# Patient Record
Sex: MALE
[De-identification: names, ages, dates, MRNs, and addresses within clinical notes are randomized per-mention and may not be internally consistent; named-entity substitution may affect disease eponyms.]

---

## 2018-06-16 ENCOUNTER — HOSPITAL (OUTPATIENT)
Dept: OTHER | Age: 43
End: 2018-06-16
Attending: EMERGENCY MEDICINE

## 2018-09-27 ENCOUNTER — HOSPITAL ENCOUNTER (EMERGENCY)
Facility: HOSPITAL | Age: 43
Discharge: HOME | End: 2018-09-27
Attending: INTERNAL MEDICINE

## 2018-09-27 VITALS
WEIGHT: 178 LBS | HEART RATE: 74 BPM | TEMPERATURE: 99 F | DIASTOLIC BLOOD PRESSURE: 73 MMHG | SYSTOLIC BLOOD PRESSURE: 127 MMHG | HEIGHT: 70 IN | BODY MASS INDEX: 25.48 KG/M2 | RESPIRATION RATE: 18 BRPM | OXYGEN SATURATION: 95 %

## 2018-09-27 DIAGNOSIS — R10.9 ABDOMINAL PAIN, UNSPECIFIED ABDOMINAL LOCATION: ICD-10-CM

## 2018-09-27 DIAGNOSIS — R74.01 TRANSAMINITIS: Primary | ICD-10-CM

## 2018-09-27 DIAGNOSIS — R07.9 CHEST PAIN: ICD-10-CM

## 2018-09-27 DIAGNOSIS — R07.89 CHEST DISCOMFORT: ICD-10-CM

## 2018-09-27 DIAGNOSIS — M54.6 ACUTE BILATERAL THORACIC BACK PAIN: ICD-10-CM

## 2018-09-27 LAB
ALBUMIN SERPL-MCNC: 2.9 G/DL (ref 3.3–5.5)
ALBUMIN SERPL-MCNC: 3 G/DL (ref 3.3–5.5)
ALP SERPL-CCNC: 229 U/L (ref 42–141)
ALP SERPL-CCNC: 237 U/L (ref 42–141)
BILIRUB SERPL-MCNC: 0.8 MG/DL (ref 0.2–1.6)
BILIRUB SERPL-MCNC: 0.8 MG/DL (ref 0.2–1.6)
BUN SERPL-MCNC: 9 MG/DL (ref 7–22)
CALCIUM SERPL-MCNC: 8.8 MG/DL (ref 8–10.3)
CHLORIDE SERPL-SCNC: 104 MMOL/L (ref 98–108)
CREAT SERPL-MCNC: 1.3 MG/DL (ref 0.6–1.2)
CTP QC/QA: YES
FLUAV AG NPH QL: NEGATIVE
FLUBV AG NPH QL: NEGATIVE
GLUCOSE SERPL-MCNC: 122 MG/DL (ref 73–118)
POC ALT (SGPT): 667 U/L (ref 10–47)
POC ALT (SGPT): 668 U/L (ref 10–47)
POC AMYLASE: 114 U/L (ref 14–97)
POC AST (SGOT): 678 U/L (ref 11–38)
POC AST (SGOT): 690 U/L (ref 11–38)
POC CARDIAC TROPONIN I: 0 NG/ML
POC GGT: 152 U/L (ref 5–65)
POC PTINR: 1 (ref 0.9–1.2)
POC PTWBT: 11.6 SEC (ref 9.7–14.3)
POC TCO2: 25 MMOL/L (ref 18–33)
POTASSIUM BLD-SCNC: 3.7 MMOL/L (ref 3.6–5.1)
PROTEIN, POC: 6.3 G/DL (ref 6.4–8.1)
PROTEIN, POC: 6.4 G/DL (ref 6.4–8.1)
SAMPLE: NORMAL
SAMPLE: NORMAL
SODIUM BLD-SCNC: 140 MMOL/L (ref 128–145)

## 2018-09-27 PROCEDURE — 96374 THER/PROPH/DIAG INJ IV PUSH: CPT

## 2018-09-27 PROCEDURE — 85025 COMPLETE CBC W/AUTO DIFF WBC: CPT

## 2018-09-27 PROCEDURE — 99284 EMERGENCY DEPT VISIT MOD MDM: CPT | Mod: 25

## 2018-09-27 PROCEDURE — 96361 HYDRATE IV INFUSION ADD-ON: CPT

## 2018-09-27 PROCEDURE — 80053 COMPREHEN METABOLIC PANEL: CPT

## 2018-09-27 PROCEDURE — 85610 PROTHROMBIN TIME: CPT

## 2018-09-27 PROCEDURE — 81003 URINALYSIS AUTO W/O SCOPE: CPT

## 2018-09-27 PROCEDURE — 63600175 PHARM REV CODE 636 W HCPCS: Performed by: PHYSICIAN ASSISTANT

## 2018-09-27 PROCEDURE — 87804 INFLUENZA ASSAY W/OPTIC: CPT

## 2018-09-27 PROCEDURE — 82040 ASSAY OF SERUM ALBUMIN: CPT

## 2018-09-27 PROCEDURE — 93005 ELECTROCARDIOGRAM TRACING: CPT

## 2018-09-27 PROCEDURE — 93010 ELECTROCARDIOGRAM REPORT: CPT | Mod: ,,, | Performed by: INTERNAL MEDICINE

## 2018-09-27 PROCEDURE — 84484 ASSAY OF TROPONIN QUANT: CPT

## 2018-09-27 PROCEDURE — 25000003 PHARM REV CODE 250: Performed by: PHYSICIAN ASSISTANT

## 2018-09-27 RX ORDER — HYDROMORPHONE HYDROCHLORIDE 2 MG/ML
0.5 INJECTION, SOLUTION INTRAMUSCULAR; INTRAVENOUS; SUBCUTANEOUS ONCE
Status: COMPLETED | OUTPATIENT
Start: 2018-09-27 | End: 2018-09-27

## 2018-09-27 RX ADMIN — HYDROMORPHONE HYDROCHLORIDE 0.5 MG: 2 INJECTION, SOLUTION INTRAMUSCULAR; INTRAVENOUS; SUBCUTANEOUS at 07:09

## 2018-09-27 RX ADMIN — SODIUM CHLORIDE 1000 ML: 0.9 INJECTION, SOLUTION INTRAVENOUS at 07:09

## 2018-09-27 NOTE — ED NOTES
Spoke with pt. He reports all started yesterday,  Took ibuprofen  Yesterday, none taken today.  Generalized body aching mostly the arms

## 2018-09-28 NOTE — DISCHARGE INSTRUCTIONS
Drink lots of fluids, stay well hydrated. Follow-up and establish care with gastroenterology for reevaluation and further recommendations. Return to this ED if unable to tolerate by mouth intake, if you begin with shortness of breath or difficulty breathing, if any other problems occur.
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
## 2018-09-28 NOTE — ED NOTES
PT reports a little decrease in body aches since medications was given. Resp even and non labored. Family at bedside. Will continue to monitor. Warm blanket given per request.

## 2018-09-28 NOTE — ED NOTES
PT lying with eyes closed. Responds to verbal stimuli. Resp even and non labored. NAD noted. Reports pain at 5/10. Family at bedside. Will continue to monitor.

## 2018-10-02 LAB
BILIRUBIN, POC UA: NEGATIVE
BLOOD, POC UA: NEGATIVE
CLARITY, POC UA: CLEAR
COLOR, POC UA: ABNORMAL
GLUCOSE, POC UA: NEGATIVE
KETONES, POC UA: NEGATIVE
LEUKOCYTE EST, POC UA: NEGATIVE
NITRITE, POC UA: NEGATIVE
PH UR STRIP: 7 [PH]
PROTEIN, POC UA: NEGATIVE
SPECIFIC GRAVITY, POC UA: 1.02
UROBILINOGEN, POC UA: 0.2 E.U./DL

## 2020-01-11 ENCOUNTER — HOSPITAL ENCOUNTER (EMERGENCY)
Facility: HOSPITAL | Age: 45
Discharge: HOME | End: 2020-01-11
Attending: EMERGENCY MEDICINE
Payer: MEDICAID

## 2020-01-11 VITALS
SYSTOLIC BLOOD PRESSURE: 124 MMHG | OXYGEN SATURATION: 100 % | HEIGHT: 70 IN | HEART RATE: 68 BPM | TEMPERATURE: 99 F | WEIGHT: 175 LBS | BODY MASS INDEX: 25.05 KG/M2 | DIASTOLIC BLOOD PRESSURE: 70 MMHG | RESPIRATION RATE: 18 BRPM

## 2020-01-11 DIAGNOSIS — R10.9 ABDOMINAL PAIN, UNSPECIFIED ABDOMINAL LOCATION: Primary | ICD-10-CM

## 2020-01-11 DIAGNOSIS — R74.01 TRANSAMINITIS: ICD-10-CM

## 2020-01-11 DIAGNOSIS — R16.0 LIVER MASS: ICD-10-CM

## 2020-01-11 DIAGNOSIS — R05.9 COUGH: ICD-10-CM

## 2020-01-11 DIAGNOSIS — E86.0 DEHYDRATION: ICD-10-CM

## 2020-01-11 LAB
ALBUMIN SERPL-MCNC: 2.7 G/DL (ref 3.3–5.5)
ALBUMIN SERPL-MCNC: 3.3 G/DL (ref 3.3–5.5)
ALBUMIN SERPL-MCNC: 3.3 G/DL (ref 3.3–5.5)
ALP SERPL-CCNC: 150 U/L (ref 42–141)
ALP SERPL-CCNC: 164 U/L (ref 42–141)
ALP SERPL-CCNC: 169 U/L (ref 42–141)
BILIRUB SERPL-MCNC: 0.8 MG/DL (ref 0.2–1.6)
BILIRUB SERPL-MCNC: 1.1 MG/DL (ref 0.2–1.6)
BILIRUB SERPL-MCNC: 1.2 MG/DL (ref 0.2–1.6)
BUN SERPL-MCNC: 12 MG/DL (ref 7–22)
BUN SERPL-MCNC: 14 MG/DL (ref 7–22)
CALCIUM SERPL-MCNC: 8 MG/DL (ref 8–10.3)
CALCIUM SERPL-MCNC: 9.1 MG/DL (ref 8–10.3)
CHLORIDE SERPL-SCNC: 102 MMOL/L (ref 98–108)
CHLORIDE SERPL-SCNC: 108 MMOL/L (ref 98–108)
CREAT SERPL-MCNC: 1.1 MG/DL (ref 0.6–1.2)
CREAT SERPL-MCNC: 1.4 MG/DL (ref 0.6–1.2)
CTP QC/QA: YES
FLUAV AG NPH QL: NEGATIVE
FLUBV AG NPH QL: NEGATIVE
GLUCOSE SERPL-MCNC: 118 MG/DL (ref 73–118)
GLUCOSE SERPL-MCNC: 168 MG/DL (ref 73–118)
POC ALT (SGPT): 149 U/L (ref 10–47)
POC ALT (SGPT): 167 U/L (ref 10–47)
POC ALT (SGPT): 172 U/L (ref 10–47)
POC AMYLASE: 115 U/L (ref 14–97)
POC AST (SGOT): 107 U/L (ref 11–38)
POC AST (SGOT): 111 U/L (ref 11–38)
POC AST (SGOT): 98 U/L (ref 11–38)
POC GGT: 76 U/L (ref 5–65)
POC TCO2: 26 MMOL/L (ref 18–33)
POC TCO2: 26 MMOL/L (ref 18–33)
POTASSIUM BLD-SCNC: 3.5 MMOL/L (ref 3.6–5.1)
POTASSIUM BLD-SCNC: 4 MMOL/L (ref 3.6–5.1)
PROTEIN, POC: 6.3 G/DL (ref 6.4–8.1)
PROTEIN, POC: 7 G/DL (ref 6.4–8.1)
PROTEIN, POC: 7.3 G/DL (ref 6.4–8.1)
SODIUM BLD-SCNC: 139 MMOL/L (ref 128–145)
SODIUM BLD-SCNC: 140 MMOL/L (ref 128–145)

## 2020-01-11 PROCEDURE — 99285 EMERGENCY DEPT VISIT HI MDM: CPT | Mod: 25,ER

## 2020-01-11 PROCEDURE — 87804 INFLUENZA ASSAY W/OPTIC: CPT | Mod: 59,ER

## 2020-01-11 PROCEDURE — 80053 COMPREHEN METABOLIC PANEL: CPT | Mod: ER

## 2020-01-11 PROCEDURE — 85025 COMPLETE CBC W/AUTO DIFF WBC: CPT | Mod: ER

## 2020-01-11 PROCEDURE — 82150 ASSAY OF AMYLASE: CPT | Mod: ER

## 2020-01-11 PROCEDURE — 25500020 PHARM REV CODE 255: Mod: ER | Performed by: EMERGENCY MEDICINE

## 2020-01-11 PROCEDURE — 25000003 PHARM REV CODE 250: Mod: ER | Performed by: PHYSICIAN ASSISTANT

## 2020-01-11 PROCEDURE — 96374 THER/PROPH/DIAG INJ IV PUSH: CPT | Mod: 59,ER

## 2020-01-11 PROCEDURE — 96361 HYDRATE IV INFUSION ADD-ON: CPT | Mod: ER

## 2020-01-11 PROCEDURE — 63600175 PHARM REV CODE 636 W HCPCS: Mod: ER | Performed by: PHYSICIAN ASSISTANT

## 2020-01-11 PROCEDURE — 96375 TX/PRO/DX INJ NEW DRUG ADDON: CPT | Mod: ER

## 2020-01-11 RX ORDER — ONDANSETRON 4 MG/1
4 TABLET, ORALLY DISINTEGRATING ORAL
Status: DISCONTINUED | OUTPATIENT
Start: 2020-01-11 | End: 2020-01-11

## 2020-01-11 RX ORDER — ONDANSETRON HYDROCHLORIDE 4 MG/5ML
4 SOLUTION ORAL
Status: DISCONTINUED | OUTPATIENT
Start: 2020-01-11 | End: 2020-01-11

## 2020-01-11 RX ORDER — ONDANSETRON 4 MG/1
4 TABLET, ORALLY DISINTEGRATING ORAL EVERY 8 HOURS PRN
Qty: 10 TABLET | Refills: 0 | Status: ON HOLD | OUTPATIENT
Start: 2020-01-11 | End: 2020-02-01 | Stop reason: HOSPADM

## 2020-01-11 RX ORDER — ACETAMINOPHEN 500 MG
1000 TABLET ORAL
Status: COMPLETED | OUTPATIENT
Start: 2020-01-11 | End: 2020-01-11

## 2020-01-11 RX ORDER — DICYCLOMINE HYDROCHLORIDE 20 MG/1
20 TABLET ORAL 2 TIMES DAILY
Qty: 20 TABLET | Refills: 0 | Status: SHIPPED | OUTPATIENT
Start: 2020-01-11 | End: 2020-02-10

## 2020-01-11 RX ORDER — ONDANSETRON 2 MG/ML
4 INJECTION INTRAMUSCULAR; INTRAVENOUS
Status: COMPLETED | OUTPATIENT
Start: 2020-01-11 | End: 2020-01-11

## 2020-01-11 RX ORDER — KETOROLAC TROMETHAMINE 30 MG/ML
15 INJECTION, SOLUTION INTRAMUSCULAR; INTRAVENOUS
Status: COMPLETED | OUTPATIENT
Start: 2020-01-11 | End: 2020-01-11

## 2020-01-11 RX ADMIN — IOHEXOL 100 ML: 350 INJECTION, SOLUTION INTRAVENOUS at 04:01

## 2020-01-11 RX ADMIN — KETOROLAC TROMETHAMINE 15 MG: 30 INJECTION, SOLUTION INTRAMUSCULAR at 03:01

## 2020-01-11 RX ADMIN — ACETAMINOPHEN 1000 MG: 500 TABLET ORAL at 03:01

## 2020-01-11 RX ADMIN — SODIUM CHLORIDE 1000 ML: 0.9 INJECTION, SOLUTION INTRAVENOUS at 05:01

## 2020-01-11 RX ADMIN — SODIUM CHLORIDE 1000 ML: 0.9 INJECTION, SOLUTION INTRAVENOUS at 03:01

## 2020-01-11 RX ADMIN — ONDANSETRON HYDROCHLORIDE 4 MG: 2 SOLUTION INTRAMUSCULAR; INTRAVENOUS at 03:01

## 2020-01-11 NOTE — ED NOTES
Second infusion of fluids up as ordered,  Pt. Also given a bottle of poweraid to drink as requested by np
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
## 2020-01-12 NOTE — DISCHARGE INSTRUCTIONS
Follow-up with gastroenterologist for surveillance of liver mass and elevated liver function tests.

## 2020-01-13 ENCOUNTER — HOSPITAL ENCOUNTER (EMERGENCY)
Facility: HOSPITAL | Age: 45
Discharge: HOME | End: 2020-01-13
Attending: EMERGENCY MEDICINE
Payer: MEDICAID

## 2020-01-13 VITALS
HEART RATE: 88 BPM | RESPIRATION RATE: 16 BRPM | TEMPERATURE: 99 F | OXYGEN SATURATION: 95 % | DIASTOLIC BLOOD PRESSURE: 61 MMHG | SYSTOLIC BLOOD PRESSURE: 120 MMHG

## 2020-01-13 DIAGNOSIS — R07.9 CHEST PAIN: ICD-10-CM

## 2020-01-13 DIAGNOSIS — R07.89 CHEST DISCOMFORT: ICD-10-CM

## 2020-01-13 DIAGNOSIS — E87.6 HYPOKALEMIA: ICD-10-CM

## 2020-01-13 DIAGNOSIS — J45.20 MILD INTERMITTENT REACTIVE AIRWAY DISEASE WITHOUT COMPLICATION: Primary | ICD-10-CM

## 2020-01-13 DIAGNOSIS — R50.9 FEVER: ICD-10-CM

## 2020-01-13 DIAGNOSIS — N30.00 ACUTE CYSTITIS WITHOUT HEMATURIA: ICD-10-CM

## 2020-01-13 DIAGNOSIS — R50.9 FEBRILE ILLNESS: ICD-10-CM

## 2020-01-13 LAB
ALBUMIN SERPL-MCNC: 2.3 G/DL (ref 3.3–5.5)
ALLENS TEST: ABNORMAL
ALP SERPL-CCNC: 172 U/L (ref 42–141)
BILIRUB SERPL-MCNC: 1.1 MG/DL (ref 0.2–1.6)
BILIRUBIN, POC UA: ABNORMAL
BLOOD, POC UA: ABNORMAL
BUN SERPL-MCNC: 16 MG/DL (ref 7–22)
CALCIUM SERPL-MCNC: 8.9 MG/DL (ref 8–10.3)
CHLORIDE SERPL-SCNC: 105 MMOL/L (ref 98–108)
CLARITY, POC UA: CLEAR
COLOR, POC UA: ABNORMAL
CREAT SERPL-MCNC: 1.4 MG/DL (ref 0.6–1.2)
CTP QC/QA: YES
GLUCOSE SERPL-MCNC: 130 MG/DL (ref 73–118)
GLUCOSE, POC UA: NEGATIVE
HCO3 UR-SCNC: 21.6 MMOL/L (ref 24–28)
KETONES, POC UA: NEGATIVE
LDH SERPL L TO P-CCNC: 2.02 MMOL/L (ref 0.5–2.2)
LEUKOCYTE EST, POC UA: ABNORMAL
NITRITE, POC UA: POSITIVE
PCO2 BLDA: 32.1 MMHG (ref 35–45)
PH SMN: 7.44 [PH] (ref 7.35–7.45)
PH UR STRIP: 6.5 [PH]
PO2 BLDA: 106 MMHG (ref 40–60)
POC ALT (SGPT): 109 U/L (ref 10–47)
POC AST (SGOT): 52 U/L (ref 11–38)
POC BE: -2 MMOL/L
POC MOLECULAR INFLUENZA A AGN: NEGATIVE
POC MOLECULAR INFLUENZA B AGN: NEGATIVE
POC SATURATED O2: 98 % (ref 95–100)
POC TCO2: 23 MMOL/L (ref 24–29)
POC TCO2: 24 MMOL/L (ref 18–33)
POTASSIUM BLD-SCNC: 3.1 MMOL/L (ref 3.6–5.1)
PROTEIN, POC UA: ABNORMAL
PROTEIN, POC: 6.3 G/DL (ref 6.4–8.1)
SAMPLE: ABNORMAL
SITE: ABNORMAL
SODIUM BLD-SCNC: 139 MMOL/L (ref 128–145)
SPECIFIC GRAVITY, POC UA: 1.02
UROBILINOGEN, POC UA: >=8 E.U./DL

## 2020-01-13 PROCEDURE — 85025 COMPLETE CBC W/AUTO DIFF WBC: CPT | Mod: ER

## 2020-01-13 PROCEDURE — 80053 COMPREHEN METABOLIC PANEL: CPT | Mod: ER

## 2020-01-13 PROCEDURE — 93010 ELECTROCARDIOGRAM REPORT: CPT | Mod: ,,, | Performed by: INTERNAL MEDICINE

## 2020-01-13 PROCEDURE — 96375 TX/PRO/DX INJ NEW DRUG ADDON: CPT | Mod: ER

## 2020-01-13 PROCEDURE — 99285 EMERGENCY DEPT VISIT HI MDM: CPT | Mod: 25,ER

## 2020-01-13 PROCEDURE — 82803 BLOOD GASES ANY COMBINATION: CPT | Mod: ER

## 2020-01-13 PROCEDURE — 93005 ELECTROCARDIOGRAM TRACING: CPT | Mod: ER

## 2020-01-13 PROCEDURE — 87077 CULTURE AEROBIC IDENTIFY: CPT | Mod: 59

## 2020-01-13 PROCEDURE — 25000003 PHARM REV CODE 250: Mod: ER | Performed by: NURSE PRACTITIONER

## 2020-01-13 PROCEDURE — 87040 BLOOD CULTURE FOR BACTERIA: CPT

## 2020-01-13 PROCEDURE — 93010 EKG 12-LEAD: ICD-10-PCS | Mod: ,,, | Performed by: INTERNAL MEDICINE

## 2020-01-13 PROCEDURE — 87502 INFLUENZA DNA AMP PROBE: CPT | Mod: 59,ER

## 2020-01-13 PROCEDURE — 96365 THER/PROPH/DIAG IV INF INIT: CPT | Mod: ER

## 2020-01-13 PROCEDURE — 63600175 PHARM REV CODE 636 W HCPCS: Mod: ER | Performed by: EMERGENCY MEDICINE

## 2020-01-13 PROCEDURE — 87186 SC STD MICRODIL/AGAR DIL: CPT | Mod: 59

## 2020-01-13 PROCEDURE — 25000242 PHARM REV CODE 250 ALT 637 W/ HCPCS: Mod: ER | Performed by: EMERGENCY MEDICINE

## 2020-01-13 PROCEDURE — 87088 URINE BACTERIA CULTURE: CPT

## 2020-01-13 PROCEDURE — 87086 URINE CULTURE/COLONY COUNT: CPT

## 2020-01-13 PROCEDURE — 96361 HYDRATE IV INFUSION ADD-ON: CPT | Mod: ER

## 2020-01-13 PROCEDURE — 25000003 PHARM REV CODE 250: Mod: ER | Performed by: EMERGENCY MEDICINE

## 2020-01-13 PROCEDURE — 94640 AIRWAY INHALATION TREATMENT: CPT | Mod: ER

## 2020-01-13 PROCEDURE — 81003 URINALYSIS AUTO W/O SCOPE: CPT | Mod: ER

## 2020-01-13 RX ORDER — ALBUTEROL SULFATE 2.5 MG/.5ML
2.5 SOLUTION RESPIRATORY (INHALATION)
Status: COMPLETED | OUTPATIENT
Start: 2020-01-13 | End: 2020-01-13

## 2020-01-13 RX ORDER — FLUTICASONE PROPIONATE 50 MCG
2 SPRAY, SUSPENSION (ML) NASAL DAILY
Qty: 16 G | Refills: 0 | Status: ON HOLD | OUTPATIENT
Start: 2020-01-13 | End: 2020-02-01 | Stop reason: HOSPADM

## 2020-01-13 RX ORDER — PREDNISONE 20 MG/1
60 TABLET ORAL
Status: COMPLETED | OUTPATIENT
Start: 2020-01-13 | End: 2020-01-13

## 2020-01-13 RX ORDER — BENZONATATE 100 MG/1
100 CAPSULE ORAL 3 TIMES DAILY PRN
Qty: 20 CAPSULE | Refills: 0 | Status: SHIPPED | OUTPATIENT
Start: 2020-01-13 | End: 2020-01-23

## 2020-01-13 RX ORDER — MONTELUKAST SODIUM 10 MG/1
10 TABLET ORAL NIGHTLY
Qty: 30 TABLET | Refills: 0 | Status: ON HOLD | OUTPATIENT
Start: 2020-01-13 | End: 2020-02-01 | Stop reason: HOSPADM

## 2020-01-13 RX ORDER — NITROFURANTOIN 25; 75 MG/1; MG/1
100 CAPSULE ORAL 2 TIMES DAILY
Qty: 10 CAPSULE | Refills: 0 | Status: SHIPPED | OUTPATIENT
Start: 2020-01-13 | End: 2020-01-18

## 2020-01-13 RX ORDER — IBUPROFEN 600 MG/1
600 TABLET ORAL
Status: COMPLETED | OUTPATIENT
Start: 2020-01-13 | End: 2020-01-13

## 2020-01-13 RX ORDER — KETOROLAC TROMETHAMINE 30 MG/ML
30 INJECTION, SOLUTION INTRAMUSCULAR; INTRAVENOUS
Status: COMPLETED | OUTPATIENT
Start: 2020-01-13 | End: 2020-01-13

## 2020-01-13 RX ORDER — POTASSIUM CHLORIDE 20 MEQ/1
40 TABLET, EXTENDED RELEASE ORAL
Status: COMPLETED | OUTPATIENT
Start: 2020-01-13 | End: 2020-01-13

## 2020-01-13 RX ORDER — PREDNISONE 20 MG/1
40 TABLET ORAL DAILY
Qty: 10 TABLET | Refills: 0 | Status: SHIPPED | OUTPATIENT
Start: 2020-01-13 | End: 2020-01-18

## 2020-01-13 RX ORDER — IPRATROPIUM BROMIDE AND ALBUTEROL SULFATE 2.5; .5 MG/3ML; MG/3ML
3 SOLUTION RESPIRATORY (INHALATION)
Status: COMPLETED | OUTPATIENT
Start: 2020-01-13 | End: 2020-01-13

## 2020-01-13 RX ORDER — ALBUTEROL SULFATE 90 UG/1
2 AEROSOL, METERED RESPIRATORY (INHALATION) EVERY 6 HOURS PRN
Qty: 1 INHALER | Refills: 0 | Status: SHIPPED | OUTPATIENT
Start: 2020-01-13

## 2020-01-13 RX ORDER — GUAIFENESIN/DEXTROMETHORPHAN 100-10MG/5
5 SYRUP ORAL 4 TIMES DAILY PRN
Qty: 120 ML | Refills: 0 | COMMUNITY
Start: 2020-01-13 | End: 2020-01-23

## 2020-01-13 RX ADMIN — POTASSIUM CHLORIDE 40 MEQ: 1500 TABLET, EXTENDED RELEASE ORAL at 09:01

## 2020-01-13 RX ADMIN — IBUPROFEN 600 MG: 600 TABLET ORAL at 08:01

## 2020-01-13 RX ADMIN — PREDNISONE 60 MG: 20 TABLET ORAL at 09:01

## 2020-01-13 RX ADMIN — ALBUTEROL SULFATE 2.5 MG: 2.5 SOLUTION RESPIRATORY (INHALATION) at 09:01

## 2020-01-13 RX ADMIN — KETOROLAC TROMETHAMINE 30 MG: 30 INJECTION, SOLUTION INTRAMUSCULAR; INTRAVENOUS at 09:01

## 2020-01-13 RX ADMIN — SODIUM CHLORIDE 1000 ML: 0.9 INJECTION, SOLUTION INTRAVENOUS at 08:01

## 2020-01-13 RX ADMIN — SODIUM CHLORIDE 1000 ML: 0.9 INJECTION, SOLUTION INTRAVENOUS at 09:01

## 2020-01-13 RX ADMIN — CEFTRIAXONE 1 G: 1 INJECTION, SOLUTION INTRAVENOUS at 10:01

## 2020-01-13 RX ADMIN — IPRATROPIUM BROMIDE AND ALBUTEROL SULFATE 3 ML: .5; 3 SOLUTION RESPIRATORY (INHALATION) at 09:01

## 2020-01-14 NOTE — PROGRESS NOTES
Pt had a + blood cx x 1 and needs to have keflex called in 500mg 4 times a day for 10 days if feeling better per dr. Patricia and if not feeling better or running fever needs to go be admitted. Na for return call v/m left for pt to call back.

## 2020-01-14 NOTE — ED PROVIDER NOTES
"Encounter Date: 1/13/2020       History     Chief Complaint   Patient presents with    Fatigue     pt reports he has been feeling fatigue with intermittent body aches x 2 days. pt reports he began feeling this way after taking the medicine that was given to him 2 days ago(bentyl and zofran)    Generalized Body Aches     body aches x 2 days. reports taking tylenol around 3pm today     44 y.o. Male with chronic pancreatitis presents emergency department complaining of persistent fatigue and generalized body aches since he was seen in the emergency department two days ago.      Per chart review of patient's visit to this ED on January 11, 2020:  Presented with complaint of body aches, vomiting and abdominal pain.  History of temperature was 100.6° at that time.  White count was 16.1,   GGT 76 amylase 115 alk-phos 169 creatinine 1.4  CT abdomen pelvis with IV contrast negative for any acute process.  Flu swab was negative.  Patient was discharged with Zofran and Bentyl.      Since then patient states he has been on bedrest and has been eating soup been drinking water and Gatorade.  He states nausea and abdominal pain are now resolved with Bentyl and Zofran.  He now reports intermittent fever, dry mouth, nasal congestion, postnasal drip, sneezing, cough, intermittent mild headaches, intermittent shortness of breath while talking, and intermittent "striking" left-sided, constant chest pain x 2 days.  He reports taking two Tylenol tablets prior to arrival for fever and body aches.         Smokes marijuana and cigars           Review of patient's allergies indicates:   Allergen Reactions    Compazine [prochlorperazine edisylate]      "It locks up my muscles."      Past Medical History:   Diagnosis Date    Pancreatitis      Past Surgical History:   Procedure Laterality Date    ABDOMINAL SURGERY      CHOLECYSTECTOMY      LIVER SURGERY       History reviewed. No pertinent family history.  Social History "     Tobacco Use    Smoking status: Former Smoker    Smokeless tobacco: Never Used   Substance Use Topics    Alcohol use: Yes    Drug use: Never     Review of Systems   Constitutional: Positive for fatigue and fever.   HENT: Positive for congestion, postnasal drip, rhinorrhea and sneezing. Negative for sore throat, trouble swallowing and voice change.    Respiratory: Positive for cough and shortness of breath. Negative for choking.    Cardiovascular: Positive for chest pain. Negative for palpitations and leg swelling.   Gastrointestinal: Negative for abdominal pain, constipation, diarrhea, nausea and vomiting.   Genitourinary: Negative for decreased urine volume and dysuria.   Musculoskeletal: Positive for myalgias. Negative for arthralgias and gait problem.   Skin: Positive for rash.   Neurological: Positive for headaches (Intermittent, currently resolved). Negative for dizziness, tremors and syncope.   All other systems reviewed and are negative.      Physical Exam     Initial Vitals [01/13/20 1954]   BP Pulse Resp Temp SpO2   102/64 102 18 (!) 102 °F (38.9 °C) 98 %      MAP       --         Physical Exam    Nursing note and vitals reviewed.  Constitutional: He appears well-developed and well-nourished. He is not diaphoretic. No distress.   HENT:   Head: Normocephalic and atraumatic.   Mouth/Throat: Uvula is midline. Mucous membranes are dry. No uvula swelling. No oropharyngeal exudate.   Eyes: Conjunctivae and EOM are normal.   Neck: Normal range of motion and phonation normal. Neck supple. No stridor present.   Cardiovascular: Regular rhythm and intact distal pulses.   Pulmonary/Chest: No accessory muscle usage. No tachypnea. No respiratory distress.   Abdominal: He exhibits no distension. There is no tenderness.   Musculoskeletal: Normal range of motion. He exhibits no tenderness.   Neurological: He is alert and oriented to person, place, and time.   Skin: Skin is warm and intact.   Psychiatric: He has a  normal mood and affect.         ED Course   Procedures  Labs Reviewed   POCT URINALYSIS W/O SCOPE - Abnormal; Notable for the following components:       Result Value    Bilirubin, UA 2+ (*)     Blood, UA Trace-intact (*)     Protein, UA 2+ (*)     Urobilinogen, UA >=8.0 (*)     Nitrite, UA Positive (*)     Leukocytes, UA Trace (*)     All other components within normal limits   ISTAT PROCEDURE - Abnormal; Notable for the following components:    POC PCO2 32.1 (*)     POC PO2 106 (*)     POC HCO3 21.6 (*)     POC TCO2 23 (*)     All other components within normal limits   POCT CMP - Abnormal; Notable for the following components:    Alkaline Phosphatase,  (*)     ALT (SGPT),  (*)     AST (SGOT), POC 52 (*)     POC Creatinine 1.4 (*)     POC Glucose 130 (*)     POC Potassium 3.1 (*)     Protein 6.3 (*)     All other components within normal limits   CULTURE, BLOOD   CULTURE, BLOOD   CULTURE, URINE   POCT INFLUENZA A/B MOLECULAR   POCT CBC   POCT CMP     EKG Readings: (Independently Interpreted)   Initial Reading: No STEMI. Rhythm: Normal Sinus Rhythm. Heart Rate: 94. Ectopy: No Ectopy. Conduction: Normal. ST Segments: Normal ST Segments. T Waves: Normal. Axis: Normal. Clinical Impression: Normal Sinus Rhythm       Imaging Results          X-Ray Chest PA And Lateral (Final result)  Result time 01/13/20 20:25:22    Final result by Edwin Lee MD (01/13/20 20:25:22)                 Impression:      No airspace opacity.    Peribronchial thickening.  The findings may be seen with a viral or a reactive process.    Postoperative changes in the upper abdomen with pneumobilia.      Electronically signed by: Edwin Lee MD  Date:    01/13/2020  Time:    20:25             Narrative:    EXAMINATION:  XR CHEST PA AND LATERAL    CLINICAL HISTORY:  Fever, unspecified    TECHNIQUE:  PA and lateral views of the chest were performed.    COMPARISON:  11/11/2020.    FINDINGS:  The trachea is unremarkable.  The  cardiomediastinal silhouette is within normal limits.  The hilar structures are unremarkable.  The hemidiaphragms are within normal limits.  There is no evidence of free air beneath the hemidiaphragms.  There are no pleural effusions.  There is no evidence of a pneumothorax.  There is no evidence of pneumomediastinum.  No airspace opacity is present.  There is mild peribronchial thickening.  The osseous structures are unremarkable.    There are postoperative changes in the right upper abdominal quadrant.  There is stable pneumobilia.                                                 Labs Reviewed  Admission on 01/13/2020, Discharged on 01/13/2020   Component Date Value Ref Range Status    POC Molecular Influenza A Ag 01/13/2020 Negative  Negative, Not Reported Final    POC Molecular Influenza B Ag 01/13/2020 Negative  Negative, Not Reported Final     Acceptable 01/13/2020 Yes   Final    POC PH 01/13/2020 7.436  7.35 - 7.45 Final    POC PCO2 01/13/2020 32.1* 35 - 45 mmHg Final    POC PO2 01/13/2020 106* 40 - 60 mmHg Final    POC HCO3 01/13/2020 21.6* 24 - 28 mmol/L Final    POC BE 01/13/2020 -2  -2 to 2 mmol/L Final    POC SATURATED O2 01/13/2020 98  95 - 100 % Final    POC Lactate 01/13/2020 2.02  0.5 - 2.2 mmol/L Final    POC TCO2 01/13/2020 23* 24 - 29 mmol/L Final    Sample 01/13/2020 VENOUS   Final    Site 01/13/2020 Other   Final    Allens Test 01/13/2020 N/A   Final    Albumin, POC 01/13/2020 2.3  3.3 - 5.5 g/dL Final    Alkaline Phosphatase, POC 01/13/2020 172* 42 - 141 U/L Final    ALT (SGPT), POC 01/13/2020 109* 10 - 47 U/L Final    AST (SGOT), POC 01/13/2020 52* 11 - 38 U/L Final    POC BUN 01/13/2020 16  7 - 22 mg/dL Final    Calcium, POC 01/13/2020 8.9  8 - 10.3 mg/dL Final    POC Chloride 01/13/2020 105  98 - 108 mmol/L Final    POC Creatinine 01/13/2020 1.4* 0.6 - 1.2 mg/dL Final    POC Glucose 01/13/2020 130* 73 - 118 mg/dL Final    POC Potassium 01/13/2020 3.1*  3.6 - 5.1 mmol/L Final    POC Sodium 01/13/2020 139  128 - 145 mmol/L Final    Bilirubin 01/13/2020 1.1  0.2 - 1.6 mg/dL Final    POC TCO2 01/13/2020 24  18 - 33 mmol/L Final    Protein 01/13/2020 6.3* 6.4 - 8.1 g/dL Final    Glucose, UA 01/13/2020 Negative   Final    Bilirubin, UA 01/13/2020 2+*  Final    Ketones, UA 01/13/2020 Negative   Final    Spec Grav UA 01/13/2020 1.020   Final    Blood, UA 01/13/2020 Trace-intact*  Final    PH, UA 01/13/2020 6.5   Final    Protein, UA 01/13/2020 2+*  Final    Urobilinogen, UA 01/13/2020 >=8.0* E.U./dL Final    Nitrite, UA 01/13/2020 Positive*  Final    Leukocytes, UA 01/13/2020 Trace*  Final    Color, UA 01/13/2020 Orange   Final    Clarity, UA 01/13/2020 Clear   Final        Imaging Reviewed    Imaging Results          X-Ray Chest PA And Lateral (Final result)  Result time 01/13/20 20:25:22    Final result by Edwin Lee MD (01/13/20 20:25:22)                 Impression:      No airspace opacity.    Peribronchial thickening.  The findings may be seen with a viral or a reactive process.    Postoperative changes in the upper abdomen with pneumobilia.      Electronically signed by: Edwin Lee MD  Date:    01/13/2020  Time:    20:25             Narrative:    EXAMINATION:  XR CHEST PA AND LATERAL    CLINICAL HISTORY:  Fever, unspecified    TECHNIQUE:  PA and lateral views of the chest were performed.    COMPARISON:  11/11/2020.    FINDINGS:  The trachea is unremarkable.  The cardiomediastinal silhouette is within normal limits.  The hilar structures are unremarkable.  The hemidiaphragms are within normal limits.  There is no evidence of free air beneath the hemidiaphragms.  There are no pleural effusions.  There is no evidence of a pneumothorax.  There is no evidence of pneumomediastinum.  No airspace opacity is present.  There is mild peribronchial thickening.  The osseous structures are unremarkable.    There are postoperative changes in the right upper  abdominal quadrant.  There is stable pneumobilia.                                Medications given in ED    Medications   ibuprofen tablet 600 mg (600 mg Oral Given 1/13/20 2007)   sodium chloride 0.9% bolus 1,000 mL (0 mLs Intravenous Stopped 1/13/20 2126)   sodium chloride 0.9% bolus 1,000 mL (0 mLs Intravenous Stopped 1/13/20 2215)   ketorolac injection 30 mg (30 mg Intravenous Given 1/13/20 2127)   potassium chloride SA CR tablet 40 mEq (40 mEq Oral Given 1/13/20 2127)   albuterol sulfate nebulizer solution 2.5 mg (2.5 mg Nebulization Given 1/13/20 2115)   albuterol-ipratropium 2.5 mg-0.5 mg/3 mL nebulizer solution 3 mL (3 mLs Nebulization Given 1/13/20 2150)   predniSONE tablet 60 mg (60 mg Oral Given 1/13/20 2154)   cefTRIAXone (ROCEPHIN) 1 g in dextrose 5 % 50 mL IVPB (0 g Intravenous Stopped 1/13/20 2306)       Note was created using voice recognition software. Note may have occasional typographical errors that may not have been identified and edited despite good roseanna initial review prior to signing.             ED Course as of Jan 14 0308 Mon Jan 13, 2020   2253 CP improved after neb x 2. Patient feeling much better after fever resolved and IVFs given.    [DL]      ED Course User Index  [DL] Ramon Patricia MD           Discharge Medications     Discharge Medication List as of 1/13/2020 10:51 PM      START taking these medications    Details   albuterol (PROVENTIL/VENTOLIN HFA) 90 mcg/actuation inhaler Inhale 2 puffs into the lungs every 6 (six) hours as needed for Wheezing or Shortness of Breath., Starting Mon 1/13/2020, Normal      benzonatate (TESSALON) 100 MG capsule Take 1 capsule (100 mg total) by mouth 3 (three) times daily as needed for Cough., Starting Mon 1/13/2020, Until Thu 1/23/2020, Normal      dextromethorphan-guaifenesin  mg/5 ml (ROBITUSSIN-DM)  mg/5 mL liquid Take 5 mLs by mouth 4 (four) times daily as needed (cough)., Starting Mon 1/13/2020, Until Thu 1/23/2020, OTC       fluticasone propionate (FLONASE) 50 mcg/actuation nasal spray 2 sprays (100 mcg total) by Each Nostril route once daily., Starting Mon 1/13/2020, Normal      montelukast (SINGULAIR) 10 mg tablet Take 1 tablet (10 mg total) by mouth every evening., Starting Mon 1/13/2020, Until Wed 2/12/2020, Normal      nitrofurantoin, macrocrystal-monohydrate, (MACROBID) 100 MG capsule Take 1 capsule (100 mg total) by mouth 2 (two) times daily. for 5 days, Starting Mon 1/13/2020, Until Sat 1/18/2020, Normal      predniSONE (DELTASONE) 20 MG tablet Take 2 tablets (40 mg total) by mouth once daily. for 5 days, Starting Mon 1/13/2020, Until Sat 1/18/2020, Normal         CONTINUE these medications which have NOT CHANGED    Details   dicyclomine (BENTYL) 20 mg tablet Take 1 tablet (20 mg total) by mouth 2 (two) times daily., Starting Sat 1/11/2020, Until Mon 2/10/2020, Print      ondansetron (ZOFRAN) 4 MG tablet Take 1 tablet (4 mg total) by mouth every 8 (eight) hours as needed for Nausea., Starting 9/3/2016, Until Discontinued, Print      ondansetron (ZOFRAN-ODT) 4 MG TbDL Take 1 tablet (4 mg total) by mouth every 8 (eight) hours as needed., Starting Sat 1/11/2020, Print      oxycodone (ROXICODONE) 5 MG immediate release tablet Take 1 tablet (5 mg total) by mouth every 4 (four) hours as needed for Pain (Do not drink alcohol or drive while taking this.)., Starting 9/3/2016, Until Discontinued, Print                   Patient discharged to home in stable condition with instructions to:   1. Please take all meds as prescribed.  2. Follow-up with your primary care doctor   3. Return precautions discussed and patient and/or family/caretaker understands to return to the emergency room for any concerns including worsening of your current symptoms, fever, chills, night sweats, worsening pain, chest pain, shortness of breath, nausea, vomiting, diarrhea, bleeding, headache, difficulty talking, visual disturbances, weakness, numbness or any  other acute concerns          Clinical Impression:       ICD-10-CM ICD-9-CM   1. Mild intermittent reactive airway disease without complication J45.20 493.90   2. Fever R50.9 780.60   3. Chest discomfort R07.89 786.59   4. Chest pain R07.9 786.50   5. Acute cystitis without hematuria N30.00 595.0   6. Hypokalemia E87.6 276.8   7. Febrile illness R50.9 780.60         Disposition:   Disposition: Discharged  Condition: Stable                     Ramon Patricia MD  01/14/20 0309

## 2020-01-16 LAB
BACTERIA BLD CULT: ABNORMAL
BACTERIA UR CULT: ABNORMAL

## 2020-01-28 ENCOUNTER — HOSPITAL ENCOUNTER (INPATIENT)
Facility: HOSPITAL | Age: 45
LOS: 4 days | Discharge: HOME | DRG: 441 | End: 2020-02-01
Attending: EMERGENCY MEDICINE | Admitting: HOSPITALIST
Payer: MEDICAID

## 2020-01-28 DIAGNOSIS — R78.81 BACTEREMIA: ICD-10-CM

## 2020-01-28 DIAGNOSIS — R53.1 WEAKNESS: ICD-10-CM

## 2020-01-28 DIAGNOSIS — K75.0 LIVER ABSCESS: Primary | ICD-10-CM

## 2020-01-28 LAB
ALBUMIN SERPL-MCNC: 2.5 G/DL (ref 3.3–5.5)
ALBUMIN SERPL-MCNC: 2.7 G/DL (ref 3.3–5.5)
ALLENS TEST: ABNORMAL
ALP SERPL-CCNC: 211 U/L (ref 42–141)
ALP SERPL-CCNC: 214 U/L (ref 42–141)
AMMONIA PLAS-SCNC: 39 UMOL/L (ref 10–50)
AMPHET+METHAMPHET UR QL: NEGATIVE
BARBITURATES UR QL SCN>200 NG/ML: NEGATIVE
BENZODIAZ UR QL SCN>200 NG/ML: NEGATIVE
BILIRUB SERPL-MCNC: 0.8 MG/DL (ref 0.2–1.6)
BILIRUB SERPL-MCNC: 0.8 MG/DL (ref 0.2–1.6)
BILIRUBIN, POC UA: NEGATIVE
BLOOD, POC UA: NEGATIVE
BUN SERPL-MCNC: 16 MG/DL (ref 7–22)
BZE UR QL SCN: NEGATIVE
CALCIUM SERPL-MCNC: 9.4 MG/DL (ref 8–10.3)
CANNABINOIDS UR QL SCN: NORMAL
CHLORIDE SERPL-SCNC: 102 MMOL/L (ref 98–108)
CLARITY, POC UA: CLEAR
COLOR, POC UA: ABNORMAL
CREAT SERPL-MCNC: 1.4 MG/DL (ref 0.6–1.2)
CREAT UR-MCNC: 222.6 MG/DL (ref 23–375)
GLUCOSE SERPL-MCNC: 115 MG/DL (ref 73–118)
GLUCOSE, POC UA: NEGATIVE
HCO3 UR-SCNC: 22.4 MMOL/L (ref 24–28)
KETONES, POC UA: NEGATIVE
LDH SERPL L TO P-CCNC: 1.18 MMOL/L (ref 0.5–2.2)
LEUKOCYTE EST, POC UA: ABNORMAL
METHADONE UR QL SCN>300 NG/ML: NEGATIVE
NITRITE, POC UA: POSITIVE
OPIATES UR QL SCN: NEGATIVE
PCO2 BLDA: 25.7 MMHG (ref 35–45)
PCP UR QL SCN>25 NG/ML: NEGATIVE
PH SMN: 7.55 [PH] (ref 7.35–7.45)
PH UR STRIP: 6 [PH]
PO2 BLDA: 44 MMHG (ref 40–60)
POC ALT (SGPT): 42 U/L (ref 10–47)
POC ALT (SGPT): 44 U/L (ref 10–47)
POC AMYLASE: 97 U/L (ref 14–97)
POC AST (SGOT): 28 U/L (ref 11–38)
POC AST (SGOT): 32 U/L (ref 11–38)
POC BE: 1 MMOL/L
POC GGT: 81 U/L (ref 5–65)
POC SATURATED O2: 87 % (ref 95–100)
POC TCO2: 23 MMOL/L (ref 24–29)
POC TCO2: 24 MMOL/L (ref 18–33)
POTASSIUM BLD-SCNC: 3.6 MMOL/L (ref 3.6–5.1)
PROTEIN, POC UA: ABNORMAL
PROTEIN, POC: 8 G/DL (ref 6.4–8.1)
PROTEIN, POC: 8.2 G/DL (ref 6.4–8.1)
SAMPLE: ABNORMAL
SITE: ABNORMAL
SODIUM BLD-SCNC: 137 MMOL/L (ref 128–145)
SPECIFIC GRAVITY, POC UA: 1.02
TOXICOLOGY INFORMATION: NORMAL
UROBILINOGEN, POC UA: 2 E.U./DL

## 2020-01-28 PROCEDURE — 93010 EKG 12-LEAD: ICD-10-PCS | Mod: ,,, | Performed by: INTERNAL MEDICINE

## 2020-01-28 PROCEDURE — 63600175 PHARM REV CODE 636 W HCPCS: Performed by: EMERGENCY MEDICINE

## 2020-01-28 PROCEDURE — 80307 DRUG TEST PRSMV CHEM ANLYZR: CPT

## 2020-01-28 PROCEDURE — 25500020 PHARM REV CODE 255: Mod: ER | Performed by: EMERGENCY MEDICINE

## 2020-01-28 PROCEDURE — 82803 BLOOD GASES ANY COMBINATION: CPT | Mod: ER

## 2020-01-28 PROCEDURE — 87040 BLOOD CULTURE FOR BACTERIA: CPT

## 2020-01-28 PROCEDURE — 96361 HYDRATE IV INFUSION ADD-ON: CPT | Mod: ER

## 2020-01-28 PROCEDURE — 63600175 PHARM REV CODE 636 W HCPCS: Mod: ER | Performed by: EMERGENCY MEDICINE

## 2020-01-28 PROCEDURE — 11000001 HC ACUTE MED/SURG PRIVATE ROOM

## 2020-01-28 PROCEDURE — 85025 COMPLETE CBC W/AUTO DIFF WBC: CPT | Mod: ER

## 2020-01-28 PROCEDURE — 82140 ASSAY OF AMMONIA: CPT

## 2020-01-28 PROCEDURE — 63600175 PHARM REV CODE 636 W HCPCS: Performed by: HOSPITALIST

## 2020-01-28 PROCEDURE — 82040 ASSAY OF SERUM ALBUMIN: CPT | Mod: ER

## 2020-01-28 PROCEDURE — 25000003 PHARM REV CODE 250: Performed by: INTERNAL MEDICINE

## 2020-01-28 PROCEDURE — 81003 URINALYSIS AUTO W/O SCOPE: CPT | Mod: ER

## 2020-01-28 PROCEDURE — 80053 COMPREHEN METABOLIC PANEL: CPT | Mod: ER

## 2020-01-28 PROCEDURE — 96375 TX/PRO/DX INJ NEW DRUG ADDON: CPT | Mod: ER

## 2020-01-28 PROCEDURE — 93005 ELECTROCARDIOGRAM TRACING: CPT | Mod: ER

## 2020-01-28 PROCEDURE — 96365 THER/PROPH/DIAG IV INF INIT: CPT | Mod: ER

## 2020-01-28 PROCEDURE — 99285 EMERGENCY DEPT VISIT HI MDM: CPT | Mod: 25,ER

## 2020-01-28 PROCEDURE — 25000003 PHARM REV CODE 250: Performed by: HOSPITALIST

## 2020-01-28 PROCEDURE — 93010 ELECTROCARDIOGRAM REPORT: CPT | Mod: ,,, | Performed by: INTERNAL MEDICINE

## 2020-01-28 RX ORDER — ACETAMINOPHEN 500 MG
500 TABLET ORAL EVERY 6 HOURS PRN
Status: DISCONTINUED | OUTPATIENT
Start: 2020-01-28 | End: 2020-02-01 | Stop reason: HOSPADM

## 2020-01-28 RX ORDER — FLUTICASONE PROPIONATE 50 MCG
2 SPRAY, SUSPENSION (ML) NASAL DAILY
Status: DISCONTINUED | OUTPATIENT
Start: 2020-01-29 | End: 2020-01-30

## 2020-01-28 RX ORDER — ONDANSETRON 2 MG/ML
4 INJECTION INTRAMUSCULAR; INTRAVENOUS EVERY 8 HOURS PRN
Status: DISCONTINUED | OUTPATIENT
Start: 2020-01-28 | End: 2020-02-01 | Stop reason: HOSPADM

## 2020-01-28 RX ORDER — HYDROMORPHONE HYDROCHLORIDE 2 MG/ML
0.5 INJECTION, SOLUTION INTRAMUSCULAR; INTRAVENOUS; SUBCUTANEOUS
Status: COMPLETED | OUTPATIENT
Start: 2020-01-28 | End: 2020-01-28

## 2020-01-28 RX ORDER — IPRATROPIUM BROMIDE AND ALBUTEROL SULFATE 2.5; .5 MG/3ML; MG/3ML
3 SOLUTION RESPIRATORY (INHALATION) EVERY 4 HOURS PRN
Status: DISCONTINUED | OUTPATIENT
Start: 2020-01-28 | End: 2020-02-01 | Stop reason: HOSPADM

## 2020-01-28 RX ORDER — IBUPROFEN 600 MG/1
600 TABLET ORAL ONCE
Status: COMPLETED | OUTPATIENT
Start: 2020-01-28 | End: 2020-01-28

## 2020-01-28 RX ORDER — SODIUM CHLORIDE 9 MG/ML
1000 INJECTION, SOLUTION INTRAVENOUS
Status: COMPLETED | OUTPATIENT
Start: 2020-01-28 | End: 2020-01-28

## 2020-01-28 RX ORDER — HYDROMORPHONE HYDROCHLORIDE 2 MG/ML
0.5 INJECTION, SOLUTION INTRAMUSCULAR; INTRAVENOUS; SUBCUTANEOUS EVERY 4 HOURS PRN
Status: DISCONTINUED | OUTPATIENT
Start: 2020-01-28 | End: 2020-01-28

## 2020-01-28 RX ORDER — SODIUM CHLORIDE 0.9 % (FLUSH) 0.9 %
10 SYRINGE (ML) INJECTION
Status: DISCONTINUED | OUTPATIENT
Start: 2020-01-28 | End: 2020-02-01 | Stop reason: HOSPADM

## 2020-01-28 RX ORDER — HYDROMORPHONE HYDROCHLORIDE 2 MG/ML
0.5 INJECTION, SOLUTION INTRAMUSCULAR; INTRAVENOUS; SUBCUTANEOUS EVERY 4 HOURS PRN
Status: DISCONTINUED | OUTPATIENT
Start: 2020-01-28 | End: 2020-01-29

## 2020-01-28 RX ORDER — METRONIDAZOLE 500 MG/100ML
500 INJECTION, SOLUTION INTRAVENOUS
Status: DISCONTINUED | OUTPATIENT
Start: 2020-01-28 | End: 2020-01-28

## 2020-01-28 RX ORDER — MONTELUKAST SODIUM 10 MG/1
10 TABLET ORAL NIGHTLY
Status: DISCONTINUED | OUTPATIENT
Start: 2020-01-28 | End: 2020-01-28

## 2020-01-28 RX ORDER — SODIUM CHLORIDE, SODIUM LACTATE, POTASSIUM CHLORIDE, CALCIUM CHLORIDE 600; 310; 30; 20 MG/100ML; MG/100ML; MG/100ML; MG/100ML
INJECTION, SOLUTION INTRAVENOUS CONTINUOUS
Status: DISCONTINUED | OUTPATIENT
Start: 2020-01-28 | End: 2020-02-01 | Stop reason: HOSPADM

## 2020-01-28 RX ORDER — OXYCODONE HYDROCHLORIDE 5 MG/1
5 TABLET ORAL EVERY 6 HOURS PRN
Status: DISCONTINUED | OUTPATIENT
Start: 2020-01-28 | End: 2020-01-28

## 2020-01-28 RX ADMIN — IOHEXOL 75 ML: 350 INJECTION, SOLUTION INTRAVENOUS at 02:01

## 2020-01-28 RX ADMIN — SODIUM CHLORIDE, SODIUM LACTATE, POTASSIUM CHLORIDE, AND CALCIUM CHLORIDE: .6; .31; .03; .02 INJECTION, SOLUTION INTRAVENOUS at 06:01

## 2020-01-28 RX ADMIN — HYDROMORPHONE HYDROCHLORIDE 0.5 MG: 2 INJECTION, SOLUTION INTRAMUSCULAR; INTRAVENOUS; SUBCUTANEOUS at 02:01

## 2020-01-28 RX ADMIN — HYDROMORPHONE HYDROCHLORIDE 0.5 MG: 2 INJECTION, SOLUTION INTRAMUSCULAR; INTRAVENOUS; SUBCUTANEOUS at 07:01

## 2020-01-28 RX ADMIN — PIPERACILLIN AND TAZOBACTAM 4.5 G: 4; .5 INJECTION, POWDER, LYOPHILIZED, FOR SOLUTION INTRAVENOUS; PARENTERAL at 10:01

## 2020-01-28 RX ADMIN — ACETAMINOPHEN 500 MG: 500 TABLET ORAL at 08:01

## 2020-01-28 RX ADMIN — IBUPROFEN 600 MG: 600 TABLET, FILM COATED ORAL at 10:01

## 2020-01-28 RX ADMIN — SODIUM CHLORIDE 1000 ML: 0.9 INJECTION, SOLUTION INTRAVENOUS at 01:01

## 2020-01-28 RX ADMIN — PIPERACILLIN AND TAZOBACTAM 4.5 G: 4; .5 INJECTION, POWDER, LYOPHILIZED, FOR SOLUTION INTRAVENOUS; PARENTERAL at 02:01

## 2020-01-28 NOTE — ED PROVIDER NOTES
"Encounter Date: 1/28/2020    SCRIBE #1 NOTE: I, Maddison Calzada, am scribing for, and in the presence of,  Dr. Celestin. I have scribed the following portions of the note - Other sections scribed: HPI, ROS, PE.       History     Chief Complaint   Patient presents with    Diarrhea     Pt states," Diarrhea and weakness since I was discharged."    Weakness     44 y.o. male with generalized weakness beginning yesterday. Patient also reports dry mouth, body aches, abdominal pain, diarrhea, loss of appetite, and weight loss. Patient is confused and spouse reports that he thinks he is speaking to her and but no words are coming out. Patient reports having one bad tooth. He denies pain with urination or BMs. Patient has been to ED twice since 01/13/2020 and has been put on abx and an inhaler, however, he is progressively worsening.  Patient has been drinking a significant amount of Pedialyte, water, and Gatorade. He reports his heart speeds up with Powerade intake recently. Patient quit smoking 5 years ago. PMHX of pancreatitis. Patient reports marijuana usage.    The history is provided by the patient. No  was used.     Review of patient's allergies indicates:   Allergen Reactions    Compazine [prochlorperazine edisylate]      "It locks up my muscles."      Past Medical History:   Diagnosis Date    Pancreatitis      Past Surgical History:   Procedure Laterality Date    ABDOMINAL SURGERY      CHOLECYSTECTOMY      LIVER SURGERY       History reviewed. No pertinent family history.  Social History     Tobacco Use    Smoking status: Former Smoker    Smokeless tobacco: Never Used   Substance Use Topics    Alcohol use: Yes    Drug use: Never     Review of Systems   Constitutional: Positive for activity change, appetite change and unexpected weight change.   HENT:        Positive dry mouth   Respiratory: Negative for shortness of breath.    Cardiovascular: Negative for chest pain. "   Gastrointestinal: Positive for abdominal pain and diarrhea.   Genitourinary: Negative for difficulty urinating and dysuria.   Musculoskeletal: Positive for myalgias.   Skin: Negative for rash.   Neurological: Positive for weakness.   Psychiatric/Behavioral: Positive for confusion.   All other systems reviewed and are negative.      Physical Exam     Initial Vitals [01/28/20 1135]   BP Pulse Resp Temp SpO2   107/80 89 16 99.4 °F (37.4 °C) 99 %      MAP       --         Physical Exam    Nursing note and vitals reviewed.  Constitutional: He appears well-developed and well-nourished. He appears lethargic. He appears distressed.   HENT:   Head: Normocephalic and atraumatic.   Mouth/Throat: Mucous membranes are dry.   Eyes: Conjunctivae are normal.   Neck: Normal range of motion. Neck supple.   Cardiovascular: Normal rate and intact distal pulses.   Heart tones are distant    Pulmonary/Chest: Breath sounds normal. No respiratory distress.   Abdominal: Soft. There is tenderness (diffusely).   Musculoskeletal: Normal range of motion.   Neurological: He is oriented to person, place, and time. He appears lethargic. GCS score is 15. GCS eye subscore is 4. GCS verbal subscore is 5. GCS motor subscore is 6.   Skin: Skin is warm and dry.   Psychiatric: He has a normal mood and affect.         ED Course   Procedures  Labs Reviewed   POCT URINALYSIS W/O SCOPE - Abnormal; Notable for the following components:       Result Value    Protein, UA 1+ (*)     Urobilinogen, UA 2.0 (*)     Nitrite, UA Positive (*)     Leukocytes, UA 1+ (*)     All other components within normal limits   ISTAT PROCEDURE - Abnormal; Notable for the following components:    POC PH 7.548 (*)     POC PCO2 25.7 (*)     POC HCO3 22.4 (*)     POC SATURATED O2 87 (*)     POC TCO2 23 (*)     All other components within normal limits   POCT LIVER PANEL - Abnormal; Notable for the following components:    Alkaline Phosphatase,  (*)     POC GGT 81 (*)     All  other components within normal limits   POCT CMP - Abnormal; Notable for the following components:    Alkaline Phosphatase,  (*)     POC Creatinine 1.4 (*)     Protein 8.2 (*)     All other components within normal limits   CULTURE, BLOOD   CULTURE, BLOOD   AMMONIA   DRUG SCREEN PANEL, URINE EMERGENCY   POCT CBC   POCT URINALYSIS W/O SCOPE   POCT GLUCOSE, HAND-HELD DEVICE   POCT CMP   POCT LIVER PANEL        ECG Results          EKG 12-lead (In process)  Result time 01/28/20 13:49:52    In process by Interface, Lab In [facility] (01/28/20 13:49:52)                 Narrative:    Test Reason : R53.1,    Vent. Rate : 078 BPM     Atrial Rate : 078 BPM     P-R Int : 152 ms          QRS Dur : 086 ms      QT Int : 356 ms       P-R-T Axes : 026 042 040 degrees     QTc Int : 405 ms    Normal sinus rhythm  Normal ECG  When compared with ECG of 13-JAN-2020 20:09,  Nonspecific T wave abnormality no longer evident in Inferior leads  Nonspecific T wave abnormality no longer evident in Lateral leads    Referred By: [email or web address]   SELF           Confirmed By:                             Imaging Results           CT Abdomen Pelvis With Contrast (Final result)  Result time 01/28/20 14:48:03    Final result by Josh Good MD (01/28/20 14:48:03)                 Impression:      1. Interval enlargement with irregular thick-walled enhancement of previous low-attenuation lesion in the lateral left hepatic lobe.  Given the short term interval changes, findings are highly concerning for abscess formation in this patient with history of fever.  While malignancy is not entirely excluded, this is considered less likely.  2. Grossly stable pneumobilia, biliary ductal dilatation and pancreatic ductal dilatation.  3. Surgical changes of prior cholecystectomy and of the bowel, without associated obstruction, as above.  4. Liquid stool within the large bowel, nonspecific but could reflect sequela of a nonspecific diarrheal illness.  5.  Additional stable findings as above.  This report was flagged in Epic as abnormal.      Electronically signed by: Josh Good MD  Date:    01/28/2020  Time:    14:48             Narrative:    EXAMINATION:  CT ABDOMEN PELVIS WITH CONTRAST    CLINICAL HISTORY:  Abd pain, fever, abscess suspected;    TECHNIQUE:  Low dose axial images, sagittal and coronal reformations were obtained from the lung bases to the pubic symphysis following the IV administration of 75 mL of Omnipaque 350 .  Oral contrast was not given.    COMPARISON:  CT abdomen and pelvis 01/11/2020    FINDINGS:  Imaged lung bases show minimal dependent atelectasis.  Base of the heart is within normal limits.    Liver is normal in size.  The previous hypodense lesion within the posterior aspect of the lateral left hepatic lobe, has increased in size now with irregular thickened walls demonstrating heterogeneous enhancement similar to adjacent hepatic parenchyma, measuring up to 5 x 5.8 cm, previously up to 3.2 cm in maximum transaxial dimension.  There is thin peripheral area of hypoattenuation with more central hypoattenuating regions associated with this mass.  No new hepatic lesion definitively seen.    Surgical changes of cholecystectomy again noted.  There is pneumobilia with intrahepatic biliary prominence, similar in appearance to the previous examination.  There is continued prominence of the common bile duct also stable.    Portal vein, SMV, splenic vein and IVC appear patent.    Continued dilatation of the pancreatic duct similar to previous examination without convincing intraluminal filling defect.  Surgical changes are again noted involving the 2nd portion of the duodenum without obstruction.  Postsurgical changes of prior gastrojejunostomy again noted.  Spleen is upper limits of normal in size similar to prior without focal process seen.  Adrenal glands are within normal limits.    Bilateral kidneys are normal in size, shape and location with  symmetric normal enhancement.  No hydronephrosis or significant perinephric stranding.  Urinary bladder is within normal limits.  Prostate and seminal vesicles are within normal limits.  Pelvic phleboliths noted.    No ascites, free air or lymphadenopathy.  No aortic aneurysm or dissection.  No significant atherosclerosis.    Appendix is within normal limits.  Surgical change of the small bowel similar to prior without obstruction.  Scattered liquid stool throughout the colon and rectum suggesting a nonspecific diarrheal illness.  No evidence of bowel obstruction or inflammation.  No pneumatosis or portal venous gas.    Osseous structures appear stable without acute or destructive process seen.                               X-Ray Chest AP Portable (Final result)  Result time 01/28/20 13:01:16    Final result by Josh Good MD (01/28/20 13:01:16)                 Impression:      No detrimental change or radiographic acute intrathoracic process seen.  Specifically, no focal consolidation.      Electronically signed by: Josh Good MD  Date:    01/28/2020  Time:    13:01             Narrative:    EXAMINATION:  XR CHEST AP PORTABLE    CLINICAL HISTORY:  Weakness    TECHNIQUE:  Single frontal view of the chest was performed.    COMPARISON:  Chest radiograph 01/13/2020    FINDINGS:  No detrimental change.The lungs are clear, with normal appearance of pulmonary vasculature and no pleural effusion or pneumothorax.    The cardiac silhouette is normal in size. The hilar and mediastinal contours are unremarkable.    Bones are intact.                                 Medical Decision Making:   History:   Old Medical Records: I decided to obtain old medical records.  Clinical Tests:   Lab Tests: Ordered  Radiological Study: Ordered  Pt refused Flu Swab.           Scribe Attestation:   Scribe #1: I performed the above scribed service and the documentation accurately describes the services I performed. I attest to the accuracy  of the note.    I attest that I personally performed the services documented by the scribe and acknowledged and confirm the content of the note.   Nurses notes were reviewed.  Lee Celestin              ED Course as of Jan 28 1604   Tue Jan 28, 2020   1245 Lactate is normal at 1.18    [MH]   1245 Alk phos is elevated 214.    [MH]   1245 Amylase is normal    [MH]   1246 CBC reveals leukocytosis at 14,000.  H&H and platelets are normal.    [MH]   1354 CXR wnl    [MH]   1429 My a independent interpretation of the EKG is normal sinus rhythm with no ST segment elevation or depression.    [MH]   1456 Nitrate positive.  1+wbcs    [MH]   1457 Interval enlargement with irregular thick-walled enhancement of previous low-attenuation lesion in the lateral left hepatic lobe.  Given the short term interval changes, findings are highly concerning for abscess formation in this patient with history of fever.  While malignancy is not entirely excluded, this is considered less likely.  2. Grossly stable pneumobilia, biliary ductal dilatation and pancreatic ductal dilatation.    [MH]   1503 Pt request transfer to [facility].    []   1557 Accepted by Dr. Ngo    []      ED Course User Index  [MH] Lee Celestin MD                Clinical Impression:     1. Liver abscess    2. Weakness                   Transfer initiated with [facility].  Patient requested [place] but on diversion, and patient would like to be transferred to [facility].      4:06 PM  The medical management of Jyoti Little has been transferred to the admitting team. Total critical care time provided by me was: 70 minutes and included Coordination of care with other physicians  Review and interpretation of radiologic studies with re-assessment of plan of care  Review and interpretation of laboratory studies with re-assessment of plan of care  Review of diagnosis, treatment options and prognosis with patient  Review of diagnosis, treatment options and  prognosis with family/caregiver.  At this time, the patient's condition is unchanged, and this was relayed to the accepting team.  Please see notes from the admitting team for continued care.  Lee Celestin 4:06 PM             Lee Celestin MD  01/28/20 1247       Lee Celestin MD  01/28/20 1507       Lee Celestin MD  01/28/20 1606

## 2020-01-28 NOTE — ED NOTES
[id number] @ bedside for pt transport to [facility]. Pt in NAD, VSS, upon ED exit via EMS stretcher, girlfriend @ side.

## 2020-01-28 NOTE — ED NOTES
Report called to OLE Flores @ [facility], awaiting transport via [facility], pt resting quietly w/eyes closed in NAD, VSS, rails up x 2, will continue to closely monitor.

## 2020-01-29 LAB
ALBUMIN SERPL BCP-MCNC: 2.1 G/DL (ref 3.5–5.2)
ALP SERPL-CCNC: 212 U/L (ref 55–135)
ALT SERPL W/O P-5'-P-CCNC: 35 U/L (ref 10–44)
ANION GAP SERPL CALC-SCNC: 9 MMOL/L (ref 8–16)
AORTIC ROOT ANNULUS: 2.85 CM
AORTIC VALVE CUSP SEPERATION: 2.19 CM
ASCENDING AORTA: 2.7 CM
AST SERPL-CCNC: 20 U/L (ref 10–40)
AV INDEX (PROSTH): 0.75
AV MEAN GRADIENT: 6 MMHG
AV PEAK GRADIENT: 10 MMHG
AV VALVE AREA: 2.81 CM2
AV VELOCITY RATIO: 0.77
BASOPHILS # BLD AUTO: 0.03 K/UL (ref 0–0.2)
BASOPHILS NFR BLD: 0.2 % (ref 0–1.9)
BILIRUB SERPL-MCNC: 0.7 MG/DL (ref 0.1–1)
BSA FOR ECHO PROCEDURE: 1.77 M2
BUN SERPL-MCNC: 15 MG/DL (ref 6–20)
CALCIUM SERPL-MCNC: 9 MG/DL (ref 8.7–10.5)
CHLORIDE SERPL-SCNC: 104 MMOL/L (ref 95–110)
CO2 SERPL-SCNC: 24 MMOL/L (ref 23–29)
CREAT SERPL-MCNC: 1.4 MG/DL (ref 0.5–1.4)
CV ECHO LV RWT: 0.67 CM
DIFFERENTIAL METHOD: ABNORMAL
DOP CALC AO PEAK VEL: 1.62 M/S
DOP CALC AO VTI: 26.7 CM
DOP CALC LVOT AREA: 3.8 CM2
DOP CALC LVOT DIAMETER: 2.19 CM
DOP CALC LVOT PEAK VEL: 1.24 M/S
DOP CALC LVOT STROKE VOLUME: 75.07 CM3
DOP CALCLVOT PEAK VEL VTI: 19.94 CM
E WAVE DECELERATION TIME: 228.52 MSEC
E/A RATIO: 1.32
E/E' RATIO: 7.43 M/S
ECHO LV POSTERIOR WALL: 1.43 CM (ref 0.6–1.1)
EOSINOPHIL # BLD AUTO: 0 K/UL (ref 0–0.5)
EOSINOPHIL NFR BLD: 0.2 % (ref 0–8)
ERYTHROCYTE [DISTWIDTH] IN BLOOD BY AUTOMATED COUNT: 13.5 % (ref 11.5–14.5)
EST. GFR  (AFRICAN AMERICAN): >60 ML/MIN/1.73 M^2
EST. GFR  (NON AFRICAN AMERICAN): >60 ML/MIN/1.73 M^2
FRACTIONAL SHORTENING: 37 % (ref 28–44)
GLUCOSE SERPL-MCNC: 101 MG/DL (ref 70–110)
GRAM STN SPEC: NORMAL
GRAM STN SPEC: NORMAL
HCT VFR BLD AUTO: 33.1 % (ref 40–54)
HGB BLD-MCNC: 10.5 G/DL (ref 14–18)
IMM GRANULOCYTES # BLD AUTO: 0.06 K/UL (ref 0–0.04)
IMM GRANULOCYTES NFR BLD AUTO: 0.4 % (ref 0–0.5)
INTERVENTRICULAR SEPTUM: 1.22 CM (ref 0.6–1.1)
IVRT: 0.1 MSEC
LA MAJOR: 4.3 CM
LA MINOR: 4.43 CM
LA WIDTH: 2.89 CM
LEFT ATRIUM SIZE: 3.33 CM
LEFT ATRIUM VOLUME INDEX: 19.9 ML/M2
LEFT ATRIUM VOLUME: 35.7 CM3
LEFT INTERNAL DIMENSION IN SYSTOLE: 2.69 CM (ref 2.1–4)
LEFT VENTRICLE DIASTOLIC VOLUME INDEX: 45.92 ML/M2
LEFT VENTRICLE DIASTOLIC VOLUME: 82.42 ML
LEFT VENTRICLE MASS INDEX: 119 G/M2
LEFT VENTRICLE SYSTOLIC VOLUME INDEX: 15 ML/M2
LEFT VENTRICLE SYSTOLIC VOLUME: 26.89 ML
LEFT VENTRICULAR INTERNAL DIMENSION IN DIASTOLE: 4.29 CM (ref 3.5–6)
LEFT VENTRICULAR MASS: 213.01 G
LV LATERAL E/E' RATIO: 6 M/S
LV SEPTAL E/E' RATIO: 9.75 M/S
LYMPHOCYTES # BLD AUTO: 1.5 K/UL (ref 1–4.8)
LYMPHOCYTES NFR BLD: 9.8 % (ref 18–48)
MCH RBC QN AUTO: 28.8 PG (ref 27–31)
MCHC RBC AUTO-ENTMCNC: 31.7 G/DL (ref 32–36)
MCV RBC AUTO: 91 FL (ref 82–98)
MONOCYTES # BLD AUTO: 1.5 K/UL (ref 0.3–1)
MONOCYTES NFR BLD: 9.8 % (ref 4–15)
MV PEAK A VEL: 0.59 M/S
MV PEAK E VEL: 0.78 M/S
NEUTROPHILS # BLD AUTO: 12.4 K/UL (ref 1.8–7.7)
NEUTROPHILS NFR BLD: 79.6 % (ref 38–73)
NRBC BLD-RTO: 0 /100 WBC
PISA TR MAX VEL: 2.67 M/S
PLATELET # BLD AUTO: 393 K/UL (ref 150–350)
PMV BLD AUTO: 9.7 FL (ref 9.2–12.9)
POTASSIUM SERPL-SCNC: 4.3 MMOL/L (ref 3.5–5.1)
PROT SERPL-MCNC: 7.3 G/DL (ref 6–8.4)
PULM VEIN S/D RATIO: 1.19
PV PEAK D VEL: 0.54 M/S
PV PEAK S VEL: 0.64 M/S
PV PEAK VELOCITY: 0.96 CM/S
RA MAJOR: 4.47 CM
RA WIDTH: 3.42 CM
RBC # BLD AUTO: 3.65 M/UL (ref 4.6–6.2)
RIGHT VENTRICULAR END-DIASTOLIC DIMENSION: 3.85 CM
RV TISSUE DOPPLER FREE WALL SYSTOLIC VELOCITY 1 (APICAL 4 CHAMBER VIEW): 16.75 CM/S
SINUS: 3.26 CM
SODIUM SERPL-SCNC: 137 MMOL/L (ref 136–145)
STJ: 2.32 CM
TDI LATERAL: 0.13 M/S
TDI SEPTAL: 0.08 M/S
TDI: 0.11 M/S
TR MAX PG: 29 MMHG
TRICUSPID ANNULAR PLANE SYSTOLIC EXCURSION: 2.92 CM
WBC # BLD AUTO: 15.55 K/UL (ref 3.9–12.7)

## 2020-01-29 PROCEDURE — 85025 COMPLETE CBC W/AUTO DIFF WBC: CPT

## 2020-01-29 PROCEDURE — 36415 COLL VENOUS BLD VENIPUNCTURE: CPT

## 2020-01-29 PROCEDURE — 11000001 HC ACUTE MED/SURG PRIVATE ROOM

## 2020-01-29 PROCEDURE — 25000242 PHARM REV CODE 250 ALT 637 W/ HCPCS: Performed by: HOSPITALIST

## 2020-01-29 PROCEDURE — 87102 FUNGUS ISOLATION CULTURE: CPT

## 2020-01-29 PROCEDURE — 87186 SC STD MICRODIL/AGAR DIL: CPT

## 2020-01-29 PROCEDURE — 87077 CULTURE AEROBIC IDENTIFY: CPT

## 2020-01-29 PROCEDURE — 25000003 PHARM REV CODE 250: Performed by: HOSPITALIST

## 2020-01-29 PROCEDURE — 80053 COMPREHEN METABOLIC PANEL: CPT

## 2020-01-29 PROCEDURE — 87205 SMEAR GRAM STAIN: CPT

## 2020-01-29 PROCEDURE — 63600175 PHARM REV CODE 636 W HCPCS: Performed by: HOSPITALIST

## 2020-01-29 PROCEDURE — 63600175 PHARM REV CODE 636 W HCPCS: Performed by: RADIOLOGY

## 2020-01-29 PROCEDURE — 87070 CULTURE OTHR SPECIMN AEROBIC: CPT

## 2020-01-29 PROCEDURE — 87075 CULTR BACTERIA EXCEPT BLOOD: CPT

## 2020-01-29 PROCEDURE — 63600175 PHARM REV CODE 636 W HCPCS: Performed by: EMERGENCY MEDICINE

## 2020-01-29 RX ORDER — HYDROMORPHONE HYDROCHLORIDE 2 MG/ML
0.5 INJECTION, SOLUTION INTRAMUSCULAR; INTRAVENOUS; SUBCUTANEOUS EVERY 4 HOURS PRN
Status: DISCONTINUED | OUTPATIENT
Start: 2020-01-29 | End: 2020-02-01 | Stop reason: HOSPADM

## 2020-01-29 RX ORDER — FENTANYL CITRATE 50 UG/ML
INJECTION, SOLUTION INTRAMUSCULAR; INTRAVENOUS CODE/TRAUMA/SEDATION MEDICATION
Status: COMPLETED | OUTPATIENT
Start: 2020-01-29 | End: 2020-01-29

## 2020-01-29 RX ORDER — HYDROCODONE BITARTRATE AND ACETAMINOPHEN 10; 325 MG/1; MG/1
1 TABLET ORAL EVERY 4 HOURS PRN
Status: DISCONTINUED | OUTPATIENT
Start: 2020-01-29 | End: 2020-02-01 | Stop reason: HOSPADM

## 2020-01-29 RX ORDER — TALC
9 POWDER (GRAM) TOPICAL NIGHTLY PRN
Status: DISCONTINUED | OUTPATIENT
Start: 2020-01-29 | End: 2020-02-01 | Stop reason: HOSPADM

## 2020-01-29 RX ORDER — HYDROXYZINE PAMOATE 25 MG/1
25 CAPSULE ORAL NIGHTLY PRN
Status: DISCONTINUED | OUTPATIENT
Start: 2020-01-29 | End: 2020-02-01 | Stop reason: HOSPADM

## 2020-01-29 RX ORDER — MIDAZOLAM HYDROCHLORIDE 1 MG/ML
INJECTION INTRAMUSCULAR; INTRAVENOUS CODE/TRAUMA/SEDATION MEDICATION
Status: COMPLETED | OUTPATIENT
Start: 2020-01-29 | End: 2020-01-29

## 2020-01-29 RX ADMIN — PIPERACILLIN AND TAZOBACTAM 4.5 G: 4; .5 INJECTION, POWDER, LYOPHILIZED, FOR SOLUTION INTRAVENOUS; PARENTERAL at 11:01

## 2020-01-29 RX ADMIN — HYDROXYZINE PAMOATE 25 MG: 25 CAPSULE ORAL at 12:01

## 2020-01-29 RX ADMIN — HYDROMORPHONE HYDROCHLORIDE 0.5 MG: 2 INJECTION, SOLUTION INTRAMUSCULAR; INTRAVENOUS; SUBCUTANEOUS at 05:01

## 2020-01-29 RX ADMIN — HYDROMORPHONE HYDROCHLORIDE 0.5 MG: 2 INJECTION, SOLUTION INTRAMUSCULAR; INTRAVENOUS; SUBCUTANEOUS at 01:01

## 2020-01-29 RX ADMIN — PIPERACILLIN AND TAZOBACTAM 4.5 G: 4; .5 INJECTION, POWDER, LYOPHILIZED, FOR SOLUTION INTRAVENOUS; PARENTERAL at 05:01

## 2020-01-29 RX ADMIN — FENTANYL CITRATE 50 MCG: 50 INJECTION, SOLUTION INTRAMUSCULAR; INTRAVENOUS at 11:01

## 2020-01-29 RX ADMIN — SODIUM CHLORIDE, SODIUM LACTATE, POTASSIUM CHLORIDE, AND CALCIUM CHLORIDE: .6; .31; .03; .02 INJECTION, SOLUTION INTRAVENOUS at 07:01

## 2020-01-29 RX ADMIN — SODIUM CHLORIDE, SODIUM LACTATE, POTASSIUM CHLORIDE, AND CALCIUM CHLORIDE: .6; .31; .03; .02 INJECTION, SOLUTION INTRAVENOUS at 05:01

## 2020-01-29 RX ADMIN — MIDAZOLAM HYDROCHLORIDE 1 MG: 1 INJECTION, SOLUTION INTRAMUSCULAR; INTRAVENOUS at 11:01

## 2020-01-29 RX ADMIN — FENTANYL CITRATE 50 MCG: 50 INJECTION, SOLUTION INTRAMUSCULAR; INTRAVENOUS at 10:01

## 2020-01-29 RX ADMIN — HYDROMORPHONE HYDROCHLORIDE 0.5 MG: 2 INJECTION, SOLUTION INTRAMUSCULAR; INTRAVENOUS; SUBCUTANEOUS at 12:01

## 2020-01-29 RX ADMIN — MIDAZOLAM HYDROCHLORIDE 1 MG: 1 INJECTION, SOLUTION INTRAMUSCULAR; INTRAVENOUS at 10:01

## 2020-01-29 RX ADMIN — PIPERACILLIN AND TAZOBACTAM 4.5 G: 4; .5 INJECTION, POWDER, LYOPHILIZED, FOR SOLUTION INTRAVENOUS; PARENTERAL at 01:01

## 2020-01-29 RX ADMIN — HYDROMORPHONE HYDROCHLORIDE 0.5 MG: 2 INJECTION, SOLUTION INTRAMUSCULAR; INTRAVENOUS; SUBCUTANEOUS at 09:01

## 2020-01-29 NOTE — PROGRESS NOTES
Margaux called stat IR consult in, she spoke with Suzi who states she will let Cali know of consult, however it might have to wait until in the morning.    Spine appears normal, range of motion is not limited, no muscle or joint tenderness

## 2020-01-29 NOTE — ASSESSMENT & PLAN NOTE
Suspected given clinical symptoms, blood cultures pending, continue IV antibiotics and supportive care with IV fluids, analgesia and antipyretics.
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
## 2020-01-29 NOTE — PROGRESS NOTES
Radiology called, spoke to Sandra Cason RN stated for a Stat IR consult it must be Dr to  phone call. Original order came from a physician at [facility]. Dr Jeni rojas.

## 2020-01-29 NOTE — NURSING
Back from surgery via bed. Pt noted alert and awake, no complaints of pain. Drain note to mid upper abdomen, no drainage noted at this time.

## 2020-01-29 NOTE — PLAN OF CARE
Patient anxious and irritable at beginning of the shift. C/o  request staff interference to his sleep.  Patient febrile at HS; MD notified Antipyretic administered with  relief .PRN pain  and sleep medication administered per pt request with effective  outcome. Per pt request urinal at bedside.  Significant other at bedside all shift.      Problem: Infection  Goal: Infection Symptom Resolution  Outcome: Ongoing, Progressing  Intervention: Prevent or Manage Infection  Flowsheets (Taken 1/29/2020 0430)  Fever Reduction/Comfort Measures: lightweight bedding; lightweight clothing; medication administered  Infection Management: aseptic technique maintained  Isolation Precautions: protective environment maintained     Problem: Pain Acute  Goal: Optimal Pain Control  Outcome: Ongoing, Progressing  Intervention: Develop Pain Management Plan  Flowsheets (Taken 1/29/2020 0436)  Pain Management Interventions: pain management plan reviewed with patient/caregiver; quiet environment facilitated  Intervention: Prevent or Manage Pain  Flowsheets (Taken 1/29/2020 0436)  Sleep/Rest Enhancement: awakenings minimized; consistent schedule promoted; family presence promoted  Sensory Stimulation Regulation: care clustered; lighting decreased; quiet environment promoted  Intervention: Optimize Psychosocial Wellbeing  Flowsheets (Taken 1/29/2020 0436)  Supportive Measures: active listening utilized; counseling provided; positive reinforcement provided; self-responsibility promoted; verbalization of feelings encouraged  Diversional Activities: television

## 2020-01-29 NOTE — NURSING
"While asking assessment questions this patient states "Dont yall know what is in my records."  "Im not trying to be difficult but you all ask the same questions."  The patient's partner then states to the patient" "I told you we should have went to [facility]." when informed by IR that this patient's stat consult from [facility] MD would be a doctor to doctor call.  Explained this to the patient.  He said angrily "What are y'all trying to figure out?"  "

## 2020-01-29 NOTE — CONSULTS
Inpatient Radiology Pre-procedure Note    History of Present Illness:  Jyoti Little is a 44 y.o. male who presents for abscess drainage.  Admission H&P reviewed.  Past Medical History:   Diagnosis Date    Pancreatitis      Past Surgical History:   Procedure Laterality Date    ABDOMINAL SURGERY      CHOLECYSTECTOMY      LIVER SURGERY         Review of Systems:   As documented in primary team H&P    Home Meds:   Prior to Admission medications    Medication Sig Start Date End Date Taking? Authorizing Provider   albuterol (PROVENTIL/VENTOLIN HFA) 90 mcg/actuation inhaler Inhale 2 puffs into the lungs every 6 (six) hours as needed for Wheezing or Shortness of Breath. 1/13/20   Ramon Patricia MD   dicyclomine (BENTYL) 20 mg tablet Take 1 tablet (20 mg total) by mouth 2 (two) times daily. 1/11/20 2/10/20  STEPHEN An   fluticasone propionate (FLONASE) 50 mcg/actuation nasal spray 2 sprays (100 mcg total) by Each Nostril route once daily. 1/13/20   Ramon Patricia MD   montelukast (SINGULAIR) 10 mg tablet Take 1 tablet (10 mg total) by mouth every evening. 1/13/20 2/12/20  Ramon Patricia MD   ondansetron (ZOFRAN) 4 MG tablet Take 1 tablet (4 mg total) by mouth every 8 (eight) hours as needed for Nausea. 9/3/16   Meghana Nevarez MD   ondansetron (ZOFRAN-ODT) 4 MG TbDL Take 1 tablet (4 mg total) by mouth every 8 (eight) hours as needed. 1/11/20   STEPHEN An   oxycodone (ROXICODONE) 5 MG immediate release tablet Take 1 tablet (5 mg total) by mouth every 4 (four) hours as needed for Pain (Do not drink alcohol or drive while taking this.). 9/3/16   Meghana Nevarez MD     Scheduled Meds:    fluticasone propionate  2 spray Each Nostril Daily    piperacillin-tazobactam (ZOSYN) IVPB  4.5 g Intravenous Q8H     Continuous Infusions:    lactated ringers 125 mL/hr at 01/29/20 0557     PRN Meds:acetaminophen, albuterol-ipratropium, HYDROmorphone, hydrOXYzine pamoate, ondansetron, sodium chloride  "0.9%  Anticoagulants/Antiplatelets: no anticoagulation    Allergies:   Review of patient's allergies indicates:   Allergen Reactions    Compazine [prochlorperazine edisylate]      "It locks up my muscles."      Sedation Hx: have not been any systemic reactions    Labs:  No results for input(s): INR in the last 168 hours.    Invalid input(s):  PT,  PTT    Recent Labs   Lab 01/29/20 0429   WBC 15.55*   HGB 10.5*   HCT 33.1*   MCV 91   *      Recent Labs   Lab 01/29/20 0429         K 4.3      CO2 24   BUN 15   CREATININE 1.4   CALCIUM 9.0   ALT 35   AST 20   ALBUMIN 2.1*   BILITOT 0.7         Vitals:  Temp: 98.3 °F (36.8 °C) (01/29/20 0737)  Pulse: 67 (01/29/20 0737)  Resp: 18 (01/29/20 0737)  BP: 125/72 (01/29/20 0737)  SpO2: 98 % (01/29/20 0737)     Physical Exam:  ASA: 3  Mallampati: 2    General: no acute distress  Mental Status: alert and oriented to person, place and time  HEENT: normocephalic, atraumatic  Chest: unlabored breathing  Heart: regular heart rate  Abdomen: nondistended  Extremity: moves all extremities    Plan: US and CT guided aspiration vs drainage of left hepatic abscess. Informed consent obtained  Sedation Plan: Moderate.    Arpit Baker MD  Department of Radiology  Pager: [phone]  "

## 2020-01-29 NOTE — PROCEDURES
Radiology Post-Procedure Note    Pre Op Diagnosis: left hepatic abscess    Post Op Diagnosis: left hepatic abscess    Procedure:left hepatic abscess drainage    Procedure performed by: Arpit Baker MD    Written Informed Consent Obtained: Yes    Specimen Removed: YES 3 cc purulent fluid    Estimated Blood Loss: Minimal    Findings: US and CT was used for localization of abnormal fluid collection. A needle was inserted into the fluid collection and purulent fluid was aspirated.  A wire was inserted into the collection and the tract was dilated.  A 8.0 Albanian all-purpose drainage catheter was inserted and a pigtail loop of the distal end was formed.  The catheter was sutured into place and approximately  3 mL fluid was removed.     Recommend flushing the drain with 10 cc of sterile saline twice daily.    A specimen was sent to the lab for further analysis and culture.    The patient tolerated procedure well and there were no complications. Please see procedure report under Imaging for further details.    Arpit Baker MD  Department of Radiology  Pager: [phone]
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
## 2020-01-29 NOTE — SUBJECTIVE & OBJECTIVE
"Past Medical History:   Diagnosis Date    Pancreatitis        Past Surgical History:   Procedure Laterality Date    ABDOMINAL SURGERY      CHOLECYSTECTOMY      LIVER SURGERY         Review of patient's allergies indicates:   Allergen Reactions    Compazine [prochlorperazine edisylate]      "It locks up my muscles."        No current facility-administered medications on file prior to encounter.      Current Outpatient Medications on File Prior to Encounter   Medication Sig    albuterol (PROVENTIL/VENTOLIN HFA) 90 mcg/actuation inhaler Inhale 2 puffs into the lungs every 6 (six) hours as needed for Wheezing or Shortness of Breath.    dicyclomine (BENTYL) 20 mg tablet Take 1 tablet (20 mg total) by mouth 2 (two) times daily.    fluticasone propionate (FLONASE) 50 mcg/actuation nasal spray 2 sprays (100 mcg total) by Each Nostril route once daily.    montelukast (SINGULAIR) 10 mg tablet Take 1 tablet (10 mg total) by mouth every evening.    ondansetron (ZOFRAN) 4 MG tablet Take 1 tablet (4 mg total) by mouth every 8 (eight) hours as needed for Nausea.    ondansetron (ZOFRAN-ODT) 4 MG TbDL Take 1 tablet (4 mg total) by mouth every 8 (eight) hours as needed.    oxycodone (ROXICODONE) 5 MG immediate release tablet Take 1 tablet (5 mg total) by mouth every 4 (four) hours as needed for Pain (Do not drink alcohol or drive while taking this.).     Family History     None        Tobacco Use    Smoking status: Former Smoker    Smokeless tobacco: Never Used   Substance and Sexual Activity    Alcohol use: Yes    Drug use: Never    Sexual activity: Not on file     Review of Systems   Constitutional: Positive for activity change, appetite change, chills, fatigue and unexpected weight change. Negative for fever.   Eyes: Negative for photophobia and visual disturbance.   Respiratory: Negative for cough and shortness of breath.    Cardiovascular: Negative for chest pain, palpitations and leg swelling. "   Gastrointestinal: Positive for abdominal pain, diarrhea and nausea. Negative for vomiting.   Genitourinary: Negative for frequency, hematuria and urgency.   Musculoskeletal: Positive for myalgias.   Skin: Negative for pallor, rash and wound.   Neurological: Negative for light-headedness and headaches.   Psychiatric/Behavioral: Positive for confusion and decreased concentration.     Objective:     Vital Signs (Most Recent):  Temp: 98.3 °F (36.8 °C) (01/28/20 1706)  Pulse: 78 (01/28/20 1706)  Resp: 15 (01/28/20 1706)  BP: 102/60 (01/28/20 1706)  SpO2: 97 % (01/28/20 1633) Vital Signs (24h Range):  Temp:  [98.3 °F (36.8 °C)-99.4 °F (37.4 °C)] 98.3 °F (36.8 °C)  Pulse:  [71-89] 78  Resp:  [14-16] 15  SpO2:  [96 %-99 %] 97 %  BP: ()/(52-80) 102/60     Weight: 63.5 kg (140 lb)  Body mass index is 20.09 kg/m².    Physical Exam   Constitutional: He is oriented to person, place, and time. He appears well-developed and well-nourished. No distress.   HENT:   Head: Normocephalic and atraumatic.   Right Ear: External ear normal.   Left Ear: External ear normal.   Nose: Nose normal.   Mouth/Throat: Oropharynx is clear and moist.   Eyes: Pupils are equal, round, and reactive to light. Conjunctivae and EOM are normal.   Neck: Normal range of motion. Neck supple.   Cardiovascular: Normal rate, regular rhythm and intact distal pulses.   Pulmonary/Chest: Effort normal and breath sounds normal. No respiratory distress. He has no wheezes. He has no rales.   Abdominal: Soft. Bowel sounds are normal. He exhibits no distension. There is tenderness.   No palpable hepatomegaly or splenomegaly   Musculoskeletal: Normal range of motion. He exhibits no edema or tenderness.   Neurological: He is alert and oriented to person, place, and time.   Skin: Skin is warm and dry.   Psychiatric: He has a normal mood and affect. Thought content normal.   Nursing note and vitals reviewed.        CRANIAL NERVES     CN III, IV, VI   Pupils are equal,  round, and reactive to light.  Extraocular motions are normal.        Significant Labs: All pertinent labs within the past 24 hours have been reviewed.    Significant Imaging: I have reviewed all pertinent imaging results/findings within the past 24 hours.

## 2020-01-29 NOTE — NURSING
Patient came to the med surg floor, significant other at the bedside.  This patient was very angry, he and his girlfriend states they should have went to [facility].  States they have never been to this hospital.  States not all hospitals treat patients with kindness. Patient assured we would communicate every medication and every procedure and purpose.  Patient was educated regarding the use of stefan hose, iv fluids, future medications to assist with his liver abcess. He verbalized understanding.

## 2020-01-29 NOTE — ASSESSMENT & PLAN NOTE
Evident on CT, IR consulted for drain placement, continue IV antibiotics and supportive care with IV fluids, analgesia and antipyretics.

## 2020-01-30 ENCOUNTER — TELEPHONE (OUTPATIENT)
Dept: SURGERY | Facility: CLINIC | Age: 45
End: 2020-01-30

## 2020-01-30 LAB
ALBUMIN SERPL BCP-MCNC: 2 G/DL (ref 3.5–5.2)
ALP SERPL-CCNC: 358 U/L (ref 55–135)
ALT SERPL W/O P-5'-P-CCNC: 64 U/L (ref 10–44)
ANION GAP SERPL CALC-SCNC: 9 MMOL/L (ref 8–16)
AST SERPL-CCNC: 93 U/L (ref 10–40)
BASOPHILS # BLD AUTO: 0.02 K/UL (ref 0–0.2)
BASOPHILS NFR BLD: 0.2 % (ref 0–1.9)
BILIRUB SERPL-MCNC: 0.7 MG/DL (ref 0.1–1)
BUN SERPL-MCNC: 13 MG/DL (ref 6–20)
CALCIUM SERPL-MCNC: 9 MG/DL (ref 8.7–10.5)
CHLORIDE SERPL-SCNC: 102 MMOL/L (ref 95–110)
CO2 SERPL-SCNC: 25 MMOL/L (ref 23–29)
CREAT SERPL-MCNC: 1.3 MG/DL (ref 0.5–1.4)
DIFFERENTIAL METHOD: ABNORMAL
EOSINOPHIL # BLD AUTO: 0.1 K/UL (ref 0–0.5)
EOSINOPHIL NFR BLD: 0.9 % (ref 0–8)
ERYTHROCYTE [DISTWIDTH] IN BLOOD BY AUTOMATED COUNT: 13.7 % (ref 11.5–14.5)
EST. GFR  (AFRICAN AMERICAN): >60 ML/MIN/1.73 M^2
EST. GFR  (NON AFRICAN AMERICAN): >60 ML/MIN/1.73 M^2
GLUCOSE SERPL-MCNC: 103 MG/DL (ref 70–110)
HCT VFR BLD AUTO: 31 % (ref 40–54)
HGB BLD-MCNC: 10.1 G/DL (ref 14–18)
IMM GRANULOCYTES # BLD AUTO: 0.03 K/UL (ref 0–0.04)
IMM GRANULOCYTES NFR BLD AUTO: 0.3 % (ref 0–0.5)
LYMPHOCYTES # BLD AUTO: 1.9 K/UL (ref 1–4.8)
LYMPHOCYTES NFR BLD: 21.2 % (ref 18–48)
MCH RBC QN AUTO: 28.6 PG (ref 27–31)
MCHC RBC AUTO-ENTMCNC: 32.6 G/DL (ref 32–36)
MCV RBC AUTO: 88 FL (ref 82–98)
MONOCYTES # BLD AUTO: 1.8 K/UL (ref 0.3–1)
MONOCYTES NFR BLD: 20 % (ref 4–15)
NEUTROPHILS # BLD AUTO: 5.1 K/UL (ref 1.8–7.7)
NEUTROPHILS NFR BLD: 57.4 % (ref 38–73)
NRBC BLD-RTO: 0 /100 WBC
PLATELET # BLD AUTO: 394 K/UL (ref 150–350)
PMV BLD AUTO: 10.2 FL (ref 9.2–12.9)
POTASSIUM SERPL-SCNC: 4 MMOL/L (ref 3.5–5.1)
PROT SERPL-MCNC: 7.1 G/DL (ref 6–8.4)
RBC # BLD AUTO: 3.53 M/UL (ref 4.6–6.2)
SODIUM SERPL-SCNC: 136 MMOL/L (ref 136–145)
WBC # BLD AUTO: 8.87 K/UL (ref 3.9–12.7)

## 2020-01-30 PROCEDURE — 85025 COMPLETE CBC W/AUTO DIFF WBC: CPT

## 2020-01-30 PROCEDURE — 99233 SBSQ HOSP IP/OBS HIGH 50: CPT | Mod: ,,, | Performed by: PHYSICIAN ASSISTANT

## 2020-01-30 PROCEDURE — 36415 COLL VENOUS BLD VENIPUNCTURE: CPT

## 2020-01-30 PROCEDURE — 80053 COMPREHEN METABOLIC PANEL: CPT

## 2020-01-30 PROCEDURE — 94761 N-INVAS EAR/PLS OXIMETRY MLT: CPT

## 2020-01-30 PROCEDURE — 99233 PR SUBSEQUENT HOSPITAL CARE,LEVL III: ICD-10-PCS | Mod: ,,, | Performed by: PHYSICIAN ASSISTANT

## 2020-01-30 PROCEDURE — 86235 NUCLEAR ANTIGEN ANTIBODY: CPT

## 2020-01-30 PROCEDURE — 99900035 HC TECH TIME PER 15 MIN (STAT)

## 2020-01-30 PROCEDURE — 11000001 HC ACUTE MED/SURG PRIVATE ROOM

## 2020-01-30 PROCEDURE — 86703 HIV-1/HIV-2 1 RESULT ANTBDY: CPT

## 2020-01-30 PROCEDURE — 80074 ACUTE HEPATITIS PANEL: CPT

## 2020-01-30 PROCEDURE — 63600175 PHARM REV CODE 636 W HCPCS: Performed by: EMERGENCY MEDICINE

## 2020-01-30 PROCEDURE — 63600175 PHARM REV CODE 636 W HCPCS: Performed by: HOSPITALIST

## 2020-01-30 PROCEDURE — 25000003 PHARM REV CODE 250: Performed by: HOSPITALIST

## 2020-01-30 RX ADMIN — Medication 9 MG: at 12:01

## 2020-01-30 RX ADMIN — HYDROCODONE BITARTRATE AND ACETAMINOPHEN 1 TABLET: 10; 325 TABLET ORAL at 06:01

## 2020-01-30 RX ADMIN — PIPERACILLIN AND TAZOBACTAM 4.5 G: 4; .5 INJECTION, POWDER, LYOPHILIZED, FOR SOLUTION INTRAVENOUS; PARENTERAL at 02:01

## 2020-01-30 RX ADMIN — HYDROMORPHONE HYDROCHLORIDE 0.5 MG: 2 INJECTION, SOLUTION INTRAMUSCULAR; INTRAVENOUS; SUBCUTANEOUS at 04:01

## 2020-01-30 RX ADMIN — HYDROMORPHONE HYDROCHLORIDE 0.5 MG: 2 INJECTION, SOLUTION INTRAMUSCULAR; INTRAVENOUS; SUBCUTANEOUS at 02:01

## 2020-01-30 RX ADMIN — HYDROMORPHONE HYDROCHLORIDE 0.5 MG: 2 INJECTION, SOLUTION INTRAMUSCULAR; INTRAVENOUS; SUBCUTANEOUS at 08:01

## 2020-01-30 RX ADMIN — PIPERACILLIN AND TAZOBACTAM 4.5 G: 4; .5 INJECTION, POWDER, LYOPHILIZED, FOR SOLUTION INTRAVENOUS; PARENTERAL at 06:01

## 2020-01-30 NOTE — SUBJECTIVE & OBJECTIVE
Interval History: pt reports feeling better since admission, soreness at drain sight     Review of Systems   Constitutional: Positive for activity change, appetite change, chills, fatigue and unexpected weight change. Negative for fever.   Eyes: Negative for photophobia and visual disturbance.   Respiratory: Negative for cough and shortness of breath.    Cardiovascular: Negative for chest pain, palpitations and leg swelling.   Gastrointestinal: Positive for abdominal pain, diarrhea and nausea. Negative for vomiting.   Genitourinary: Negative for frequency, hematuria and urgency.   Musculoskeletal: Positive for myalgias.   Skin: Negative for pallor, rash and wound.   Neurological: Negative for light-headedness and headaches.   Psychiatric/Behavioral: Negative for confusion and decreased concentration.     Objective:     Vital Signs (Most Recent):  Temp: 98.5 °F (36.9 °C) (01/30/20 0748)  Pulse: 64 (01/30/20 0748)  Resp: 18 (01/30/20 0748)  BP: 125/81 (01/30/20 0748)  SpO2: 97 % (01/30/20 0748) Vital Signs (24h Range):  Temp:  [98.5 °F (36.9 °C)-99 °F (37.2 °C)] 98.5 °F (36.9 °C)  Pulse:  [64-85] 64  Resp:  [10-19] 18  SpO2:  [96 %-100 %] 97 %  BP: (116-151)/(65-81) 125/81     Weight: 63.6 kg (140 lb 3.4 oz)  Body mass index is 20.12 kg/m².    Intake/Output Summary (Last 24 hours) at 1/30/2020 0957  Last data filed at 1/30/2020 0815  Gross per 24 hour   Intake 360 ml   Output 950 ml   Net -590 ml      Physical Exam   Constitutional: He is oriented to person, place, and time. He appears well-developed and well-nourished. No distress.   HENT:   Head: Normocephalic and atraumatic.   Right Ear: External ear normal.   Left Ear: External ear normal.   Nose: Nose normal.   Mouth/Throat: Oropharynx is clear and moist.   Eyes: Pupils are equal, round, and reactive to light. Conjunctivae and EOM are normal.   Neck: Normal range of motion. Neck supple.   Cardiovascular: Normal rate, regular rhythm and intact distal pulses.    Pulmonary/Chest: Effort normal and breath sounds normal. No respiratory distress. He has no wheezes. He has no rales.   Abdominal: Soft. Bowel sounds are normal. He exhibits no distension. There is tenderness.   No palpable hepatomegaly or splenomegaly, drain in RUQ   Musculoskeletal: Normal range of motion. He exhibits no edema or tenderness.   Neurological: He is alert and oriented to person, place, and time.   Skin: Skin is warm and dry.   Psychiatric: He has a normal mood and affect. Thought content normal.   Nursing note and vitals reviewed.      Significant Labs:   Blood Culture:   Recent Labs   Lab 01/28/20  1225 01/28/20  1310   LABBLOO No Growth to date  No Growth to date No Growth to date  No Growth to date       Significant Imaging: I have reviewed and interpreted all pertinent imaging results/findings within the past 24 hours.

## 2020-01-30 NOTE — HOSPITAL COURSE
Pt admitted from [facility] ED with liver abscess, h/o bacteremia and UTI.  Apparently bacteremia untreated as outpatient. ECHO shows no vegetations. IR consulted and small amount of pus aspirated. Drain left n place but not much output. Surgery and ID consulted.     1/30- further workup labs pending, GI, surgery and ID consulted, very minimal drainage from percut drain  1/31- repeat CT of abdomen pending- possibly dc with cipro- ?perc drain stays in length of time TBD  2/1 -Per surgery: No surgical intervention indicated, would recommend repeat imaging for potential repeat drainage prior to considering surgery.  Per ID:  Planning on po cipro on discharge until radiographic resolution of abscess. Would repeat CT in 2-3 weeks outpatient prior to ID f/u appt.   Per GI: Pt. Presents with abd. Pain and fever, found to have liver abscess, s/p IR drainage.  Possibly related to remote surgery (partial liver/bowel resection).  Follow up with liver labs, outpatient EUS +/- C-scope.  Abx per ID.  F/U in clinic.      Repeat CT - see below

## 2020-01-30 NOTE — TELEPHONE ENCOUNTER
On call MD notified of consult.        ----- Message from Patricia Cabrera sent at 1/30/2020 11:04 AM CST -----  Contact: Kacey/ [phone]    Consult  MRN:  [id number]  #:  [id number]  Consulting Dr.:  Dr Ngo  Reason for Consult:  Liver abscess, possible loculated fluid, no output from drain.  Routine or Stat:  Routine  Name and Number:  Paula/  [phone]
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
## 2020-01-30 NOTE — ASSESSMENT & PLAN NOTE
Evident on CT, IR consulted for drain placement, continue IV antibiotics and supportive care with IV fluids, analgesia and antipyretics.    ID and surgery consulted  Drain in place- no output  IV abx   Pain control   Clinically improving

## 2020-01-30 NOTE — NURSING
All purpose drain with no output & thick sediment in tube ,flushed with 10 cc sterile water as per order, tolerated well.

## 2020-01-30 NOTE — CONSULTS
[facility]  Infectious Disease  Consult Note    Patient Name: Jyoti Little  MRN: [id number]  Admission Date: 1/28/2020  Hospital Length of Stay: 2 days  Attending Physician: Mayra Ngo MD  Primary Care Provider: To Obtain Unable         Inpatient consult to Infectious Diseases  Consult performed by: STEPHEN Galindo Jr.  Consult ordered by: Mayra Ngo MD        Consult received.  Full consult to follow.    STEPHEN Dumont  Infectious Disease  [facility]

## 2020-01-30 NOTE — ASSESSMENT & PLAN NOTE
Suspected given clinical symptoms, blood cultures pending, continue IV antibiotics and supportive care with IV fluids, analgesia and antipyretics.    Repeat blood cultures pending  IV zosyn  H/o - E.coli and klebsiella   ECHO - no vegetations\

## 2020-01-30 NOTE — PLAN OF CARE
POC continues with no acute concerns. PRN pain medication  administered per pt request for pain. RAC IV infiltrated ; Slight swelling to site. Pt denies  pain. Pt educated on elevating  RUE.  0 output from the drain this shift. Significant other at bedside. Will continue to monitor   Problem: Adult Inpatient Plan of Care  Goal: Plan of Care Review  Outcome: Ongoing, Progressing  Goal: Patient-Specific Goal (Individualization)  Outcome: Ongoing, Progressing  Goal: Absence of Hospital-Acquired Illness or Injury  Outcome: Ongoing, Progressing  Goal: Optimal Comfort and Wellbeing  Outcome: Ongoing, Progressing  Goal: Readiness for Transition of Care  Outcome: Ongoing, Progressing  Goal: Rounds/Family Conference  Outcome: Ongoing, Progressing     Problem: Infection  Goal: Infection Symptom Resolution  Outcome: Ongoing, Progressing     Problem: Pain Acute  Goal: Optimal Pain Control  Outcome: Ongoing, Progressing
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
## 2020-01-30 NOTE — PLAN OF CARE
01/30/20 0904   Discharge Assessment   Assessment Type Discharge Planning Assessment   Confirmed/corrected address and phone number on facesheet? Yes   Assessment information obtained from? Patient   Prior to hospitilization cognitive status: Alert/Oriented   Prior to hospitalization functional status: Independent   Current cognitive status: Alert/Oriented   Current Functional Status: Independent   Facility Arrived From: Home    Lives With significant other   Able to Return to Prior Arrangements yes   Is patient able to care for self after discharge? Yes   Who are your caregiver(s) and their phone number(s)? elias Alcantara's significant other, [phone]      Patient's perception of discharge disposition admitted as an inpatient   Readmission Within the Last 30 Days no previous admission in last 30 days   Patient currently being followed by outpatient case management? No   Patient currently receives any other outside agency services? No   Equipment Currently Used at Home none   Do you have any problems affording any of your prescribed medications? No   Is the patient taking medications as prescribed? yes   Does the patient have transportation home? Yes   Transportation Anticipated family or friend will provide   Dialysis Name and Scheduled days N/A   Does the patient receive services at the [facility]? No   Discharge Plan A Home with family   Discharge Plan B Home with family   DME Needed Upon Discharge  none   Patient/Family in Agreement with Plan yes   Does the patient have transportation to healthcare appointments? Yes     SW to patient's room to discuss Helping the patient manage care at home.   TN/SW role explained to pt.  Patient identified using two identifiers:  Name and date of birth.    SW's name and contact info placed on white board.     Person who will help at home if needed:  elias Alcantara's significant other.     Preferred pharmacy:   [id number]  [place] [id number] [facility] [address]  [address] [address]  Phone: [phone] Fax: [phone]      "Help at home Questions" discussed and placed in "My Health Packet" and placed at bedside.     Preferred Appointment time: Morning appointments.         "

## 2020-01-31 DIAGNOSIS — K75.0 LIVER ABSCESS: Primary | ICD-10-CM

## 2020-01-31 LAB
ALBUMIN SERPL BCP-MCNC: 2 G/DL (ref 3.5–5.2)
ALP SERPL-CCNC: 438 U/L (ref 55–135)
ALT SERPL W/O P-5'-P-CCNC: 79 U/L (ref 10–44)
ANION GAP SERPL CALC-SCNC: 7 MMOL/L (ref 8–16)
AST SERPL-CCNC: 79 U/L (ref 10–40)
BACTERIA SPEC AEROBE CULT: ABNORMAL
BACTERIA SPEC AEROBE CULT: ABNORMAL
BASOPHILS # BLD AUTO: 0.02 K/UL (ref 0–0.2)
BASOPHILS NFR BLD: 0.3 % (ref 0–1.9)
BILIRUB SERPL-MCNC: 0.3 MG/DL (ref 0.1–1)
BUN SERPL-MCNC: 8 MG/DL (ref 6–20)
CALCIUM SERPL-MCNC: 8.7 MG/DL (ref 8.7–10.5)
CHLORIDE SERPL-SCNC: 105 MMOL/L (ref 95–110)
CO2 SERPL-SCNC: 27 MMOL/L (ref 23–29)
CREAT SERPL-MCNC: 1.3 MG/DL (ref 0.5–1.4)
DIFFERENTIAL METHOD: ABNORMAL
EOSINOPHIL # BLD AUTO: 0.1 K/UL (ref 0–0.5)
EOSINOPHIL NFR BLD: 1.5 % (ref 0–8)
ERYTHROCYTE [DISTWIDTH] IN BLOOD BY AUTOMATED COUNT: 13.6 % (ref 11.5–14.5)
EST. GFR  (AFRICAN AMERICAN): >60 ML/MIN/1.73 M^2
EST. GFR  (NON AFRICAN AMERICAN): >60 ML/MIN/1.73 M^2
GLUCOSE SERPL-MCNC: 110 MG/DL (ref 70–110)
HAV IGM SERPL QL IA: NEGATIVE
HBV CORE IGM SERPL QL IA: NEGATIVE
HBV SURFACE AG SERPL QL IA: NEGATIVE
HCT VFR BLD AUTO: 31.8 % (ref 40–54)
HCV AB SERPL QL IA: NEGATIVE
HGB BLD-MCNC: 10 G/DL (ref 14–18)
HIV 1+2 AB+HIV1 P24 AG SERPL QL IA: NEGATIVE
IMM GRANULOCYTES # BLD AUTO: 0.02 K/UL (ref 0–0.04)
IMM GRANULOCYTES NFR BLD AUTO: 0.3 % (ref 0–0.5)
LYMPHOCYTES # BLD AUTO: 1.9 K/UL (ref 1–4.8)
LYMPHOCYTES NFR BLD: 33 % (ref 18–48)
MCH RBC QN AUTO: 28.1 PG (ref 27–31)
MCHC RBC AUTO-ENTMCNC: 31.4 G/DL (ref 32–36)
MCV RBC AUTO: 89 FL (ref 82–98)
MONOCYTES # BLD AUTO: 1.1 K/UL (ref 0.3–1)
MONOCYTES NFR BLD: 19.1 % (ref 4–15)
NEUTROPHILS # BLD AUTO: 2.7 K/UL (ref 1.8–7.7)
NEUTROPHILS NFR BLD: 45.8 % (ref 38–73)
NRBC BLD-RTO: 0 /100 WBC
PLATELET # BLD AUTO: 400 K/UL (ref 150–350)
PMV BLD AUTO: 9.9 FL (ref 9.2–12.9)
POTASSIUM SERPL-SCNC: 3.9 MMOL/L (ref 3.5–5.1)
PROT SERPL-MCNC: 7 G/DL (ref 6–8.4)
RBC # BLD AUTO: 3.56 M/UL (ref 4.6–6.2)
SODIUM SERPL-SCNC: 139 MMOL/L (ref 136–145)
WBC # BLD AUTO: 5.81 K/UL (ref 3.9–12.7)

## 2020-01-31 PROCEDURE — 85025 COMPLETE CBC W/AUTO DIFF WBC: CPT

## 2020-01-31 PROCEDURE — 11000001 HC ACUTE MED/SURG PRIVATE ROOM

## 2020-01-31 PROCEDURE — 25500020 PHARM REV CODE 255: Performed by: HOSPITALIST

## 2020-01-31 PROCEDURE — 25000003 PHARM REV CODE 250: Performed by: HOSPITALIST

## 2020-01-31 PROCEDURE — 80053 COMPREHEN METABOLIC PANEL: CPT

## 2020-01-31 PROCEDURE — 36415 COLL VENOUS BLD VENIPUNCTURE: CPT

## 2020-01-31 PROCEDURE — 25000003 PHARM REV CODE 250: Performed by: PHYSICIAN ASSISTANT

## 2020-01-31 PROCEDURE — 63600175 PHARM REV CODE 636 W HCPCS: Performed by: EMERGENCY MEDICINE

## 2020-01-31 RX ADMIN — IOHEXOL 75 ML: 350 INJECTION, SOLUTION INTRAVENOUS at 06:01

## 2020-01-31 RX ADMIN — SODIUM CHLORIDE, SODIUM LACTATE, POTASSIUM CHLORIDE, AND CALCIUM CHLORIDE: .6; .31; .03; .02 INJECTION, SOLUTION INTRAVENOUS at 01:01

## 2020-01-31 RX ADMIN — CIPROFLOXACIN HYDROCHLORIDE 750 MG: 250 TABLET, FILM COATED ORAL at 08:01

## 2020-01-31 RX ADMIN — HYDROCODONE BITARTRATE AND ACETAMINOPHEN 1 TABLET: 10; 325 TABLET ORAL at 08:01

## 2020-01-31 NOTE — SUBJECTIVE & OBJECTIVE
Interval History: No AEON.  AFebrile and leukocytosis resolved.  Abscess Cx with pan sensitive E Coli and Kleb Pneumo.  GI rec fu as outpt. ABD pain improved. The patient denies any recent fever, chills, or sweats.      Review of Systems   Constitutional: Negative for chills, diaphoresis and fever.   Respiratory: Negative for shortness of breath.    Cardiovascular: Negative for chest pain.   Gastrointestinal: Positive for abdominal pain (improved). Negative for diarrhea, nausea and vomiting.   Genitourinary: Negative for dysuria and hematuria.     Objective:     Vital Signs (Most Recent):  Temp: 98.2 °F (36.8 °C) (01/31/20 0739)  Pulse: 60 (01/31/20 0739)  Resp: 16 (01/31/20 0739)  BP: (!) 165/76 (01/31/20 0739)  SpO2: 96 % (01/31/20 0739) Vital Signs (24h Range):  Temp:  [97.6 °F (36.4 °C)-98.8 °F (37.1 °C)] 98.2 °F (36.8 °C)  Pulse:  [60-68] 60  Resp:  [16-18] 16  SpO2:  [96 %-98 %] 96 %  BP: (122-165)/(64-85) 165/76     Weight: 63.6 kg (140 lb 3.4 oz)  Body mass index is 20.12 kg/m².    Estimated Creatinine Clearance: 64.6 mL/min (based on SCr of 1.3 mg/dL).    Physical Exam   Constitutional: He is oriented to person, place, and time. He appears well-developed and well-nourished. No distress.       HENT:   Head: Normocephalic and atraumatic.   Cardiovascular: Normal rate, regular rhythm and normal heart sounds. Exam reveals no gallop and no friction rub.   No murmur heard.  Pulmonary/Chest: Effort normal and breath sounds normal. No respiratory distress. He has no wheezes. He has no rales.   Abdominal: Soft. Bowel sounds are normal. He exhibits no distension and no mass. There is tenderness. There is no rebound and no guarding.   Musculoskeletal: He exhibits no edema.   Neurological: He is alert and oriented to person, place, and time.   Skin: Skin is warm and dry. He is not diaphoretic.   Psychiatric: He has a normal mood and affect. His behavior is normal.       Significant Labs:   Blood Culture:   Recent Labs    Lab 01/13/20  2025 01/13/20  2030 01/28/20  1225 01/28/20  1310   LABBLOO Gram stain raffaele bottle: Gram negative rods   Positive results previously called  Gram stain aer bottle: Gram negative rods   Positive results previously called  ESCHERICHIA COLI*  KLEBSIELLA PNEUMONIAE* Gram stain raffaele bottle: Gram negative rods   Results called to and read back by: Ruben Patricia MROH  Gram stain aer bottle: Gram negative rods   Positive results previously called  ESCHERICHIA COLI  For susceptibility refer to order # [id number]  *  KLEBSIELLA PNEUMONIAE  For susceptibility refer to order # [id number]  * No Growth to date  No Growth to date  No Growth to date No Growth to date  No Growth to date  No Growth to date     CBC:   Recent Labs   Lab 01/30/20  0432 01/31/20  0532   WBC 8.87 5.81   HGB 10.1* 10.0*   HCT 31.0* 31.8*   * 400*     CMP:   Recent Labs   Lab 01/30/20  0432 01/31/20  0532    139   K 4.0 3.9    105   CO2 25 27    110   BUN 13 8   CREATININE 1.3 1.3   CALCIUM 9.0 8.7   PROT 7.1 7.0   ALBUMIN 2.0* 2.0*   BILITOT 0.7 0.3   ALKPHOS 358* 438*   AST 93* 79*   ALT 64* 79*   ANIONGAP 9 7*   EGFRNONAA >60 >60     Urine Culture:   Recent Labs   Lab 01/13/20  2150   LABURIN ESCHERICHIA COLI  >100,000 cfu/ml  *     Urine Studies:   Recent Labs   Lab 01/28/20  1405   COLORU Dark yellow   SPECGRAV 1.020   NITRITE Positive*   UROBILINOGEN 2.0*   LEUKOCYTESUR 1+*     Wound Culture:   Recent Labs   Lab 01/29/20  1135   LABAERO ESCHERICHIA COLI  Many  *  KLEBSIELLA PNEUMONIAE  Rare  *     All pertinent labs within the past 24 hours have been reviewed.    Significant Imaging: I have reviewed all pertinent imaging results/findings within the past 24 hours.   IR Ascess Drainage With Tube Placement [[id number]] Resulted: 01/29/20 1655   Order Status: Completed Updated: 01/29/20 1658   Narrative:     EXAMINATION:  Drainage catheter placement    Procedural Personnel    Attending physician(s):  Samuel    Fellow physician(s): None    Resident physician(s): None    Advanced practice provider(s): None    Pre-procedure diagnosis: Hepatic abscess    Post-procedure diagnosis: Same    Indication: Leukocytosis with fluid collection    Additional clinical history: None    Complications: No immediate complications.    TECHNIQUE:  -hepatic abscess drainage catheter placement under CT and ultrasound guidance    FINDINGS:  Pre-procedure    Consent: Informed consent for the procedure was obtained and time-out was performed prior to the procedure.    Preparation: The site was prepared and draped using maximal sterile barrier technique including cutaneous antisepsis.    Antibiotic administered: Scheduled dose more than 1 hour from procedure start time  or more than 2 hours for vancomycin or fluoroquinolones    Anesthesia/sedation    Level of anesthesia/sedation: Moderate sedation (conscious sedation)    Anesthesia/sedation administered by: Independent trained observer under attending supervision with continuous monitoring of the patient's level of consciousness and physiologic status    Total intra-service sedation time (minutes): 24    Drainage catheter placement    The patient was positioned supine.  Initial imaging was performed. Local anesthesia was administered. The fluid collection was accessed using an access needle followed by wire insertion and serial dilation and a drainage catheter was placed. Position of the drainage catheter within the fluid collection was confirmed.    - Initial imaging findings: Hypodense left hepatic lobe collection    - Drainage catheter placed: Multipurpose drainage catheter    - External catheter securement: Non-absorbable suture    - Post-drainage imaging findings: No significant change    Contrast    Contrast agent: None    Contrast volume (mL): 0    Radiation Dose    CT dose length product ( mGy-cm): 398.23    Fluoroscopy time (  ):    Reference air kerma (  ):    Kerma area product  (  ):    Additional Details    Additional description of procedure: None    Equipment details: None    Specimens removed: Aspirated fluid was sent for analysis.    Estimated blood loss (mL): Less than 10    Standardized report: SIR_DrainPlacement_v2    Attestation    Signer name: Samuel    I attest that I was present for the entire procedure. I reviewed the stored images and agree with the report as written.   Impression:       Ultrasound CT-guided percutaneous placement of a 8 Polish drainage catheter into left hepatic lobe abscess, yielding 3 mL of purulent fluid.    Plan:    Flushed the catheter was sterile saline twice daily.  Reimaging recommended prior to tube removal.    ______________________________________________________________________      Electronically signed by: Arpit Baker MD  Date: 01/29/2020  Time: 16:55   IR Ultrasound Guidance [[id number]] Resulted: 01/29/20 1655   Order Status: Completed Updated: 01/29/20 1658   Narrative:     EXAMINATION:  Drainage catheter placement    Procedural Personnel    Attending physician(s): Samuel    Fellow physician(s): None    Resident physician(s): None    Advanced practice provider(s): None    Pre-procedure diagnosis: Hepatic abscess    Post-procedure diagnosis: Same    Indication: Leukocytosis with fluid collection    Additional clinical history: None    Complications: No immediate complications.    TECHNIQUE:  -hepatic abscess drainage catheter placement under CT and ultrasound guidance    FINDINGS:  Pre-procedure    Consent: Informed consent for the procedure was obtained and time-out was performed prior to the procedure.    Preparation: The site was prepared and draped using maximal sterile barrier technique including cutaneous antisepsis.    Antibiotic administered: Scheduled dose more than 1 hour from procedure start time  or more than 2 hours for vancomycin or fluoroquinolones    Anesthesia/sedation    Level of anesthesia/sedation: Moderate sedation  (conscious sedation)    Anesthesia/sedation administered by: Independent trained observer under attending supervision with continuous monitoring of the patient's level of consciousness and physiologic status    Total intra-service sedation time (minutes): 24    Drainage catheter placement    The patient was positioned supine.  Initial imaging was performed. Local anesthesia was administered. The fluid collection was accessed using an access needle followed by wire insertion and serial dilation and a drainage catheter was placed. Position of the drainage catheter within the fluid collection was confirmed.    - Initial imaging findings: Hypodense left hepatic lobe collection    - Drainage catheter placed: Multipurpose drainage catheter    - External catheter securement: Non-absorbable suture    - Post-drainage imaging findings: No significant change    Contrast    Contrast agent: None    Contrast volume (mL): 0    Radiation Dose    CT dose length product ( mGy-cm): 398.23    Fluoroscopy time (  ):    Reference air kerma (  ):    Kerma area product (  ):    Additional Details    Additional description of procedure: None    Equipment details: None    Specimens removed: Aspirated fluid was sent for analysis.    Estimated blood loss (mL): Less than 10    Standardized report: SIR_DrainPlacement_v2    Attestation    Signer name: Samuel    I attest that I was present for the entire procedure. I reviewed the stored images and agree with the report as written.   Impression:       Ultrasound CT-guided percutaneous placement of a 8 Azeri drainage catheter into left hepatic lobe abscess, yielding 3 mL of purulent fluid.    Plan:    Flushed the catheter was sterile saline twice daily.  Reimaging recommended prior to tube removal.    ______________________________________________________________________      Electronically signed by: Arpit Baker MD  Date: 01/29/2020  Time: 16:55   CT Guided Needle Placement [[id number]]  Resulted: 01/29/20 1655   Order Status: Completed Updated: 01/29/20 1658   Narrative:     EXAMINATION:  Drainage catheter placement    Procedural Personnel    Attending physician(s): Samuel    Fellow physician(s): None    Resident physician(s): None    Advanced practice provider(s): None    Pre-procedure diagnosis: Hepatic abscess    Post-procedure diagnosis: Same    Indication: Leukocytosis with fluid collection    Additional clinical history: None    Complications: No immediate complications.    TECHNIQUE:  -hepatic abscess drainage catheter placement under CT and ultrasound guidance    FINDINGS:  Pre-procedure    Consent: Informed consent for the procedure was obtained and time-out was performed prior to the procedure.    Preparation: The site was prepared and draped using maximal sterile barrier technique including cutaneous antisepsis.    Antibiotic administered: Scheduled dose more than 1 hour from procedure start time  or more than 2 hours for vancomycin or fluoroquinolones    Anesthesia/sedation    Level of anesthesia/sedation: Moderate sedation (conscious sedation)    Anesthesia/sedation administered by: Independent trained observer under attending supervision with continuous monitoring of the patient's level of consciousness and physiologic status    Total intra-service sedation time (minutes): 24    Drainage catheter placement    The patient was positioned supine.  Initial imaging was performed. Local anesthesia was administered. The fluid collection was accessed using an access needle followed by wire insertion and serial dilation and a drainage catheter was placed. Position of the drainage catheter within the fluid collection was confirmed.    - Initial imaging findings: Hypodense left hepatic lobe collection    - Drainage catheter placed: Multipurpose drainage catheter    - External catheter securement: Non-absorbable suture    - Post-drainage imaging findings: No significant  change    Contrast    Contrast agent: None    Contrast volume (mL): 0    Radiation Dose    CT dose length product ( mGy-cm): 398.23    Fluoroscopy time (  ):    Reference air kerma (  ):    Kerma area product (  ):    Additional Details    Additional description of procedure: None    Equipment details: None    Specimens removed: Aspirated fluid was sent for analysis.    Estimated blood loss (mL): Less than 10    Standardized report: SIR_DrainPlacement_v2    Attestation    Signer name: Samuel    I attest that I was present for the entire procedure. I reviewed the stored images and agree with the report as written.   Impression:       Ultrasound CT-guided percutaneous placement of a 8 Japanese drainage catheter into left hepatic lobe abscess, yielding 3 mL of purulent fluid.    Plan:    Flushed the catheter was sterile saline twice daily.  Reimaging recommended prior to tube removal.    ______________________________________________________________________      Electronically signed by: Arpit Baker MD  Date: 01/29/2020  Time: 16:55   CT Abdomen Pelvis With Contrast [[id number]] (Abnormal) Resulted: 01/28/20 1448   Order Status: Completed Updated: 01/28/20 1450   Narrative:     EXAMINATION:  CT ABDOMEN PELVIS WITH CONTRAST    CLINICAL HISTORY:  Abd pain, fever, abscess suspected;    TECHNIQUE:  Low dose axial images, sagittal and coronal reformations were obtained from the lung bases to the pubic symphysis following the IV administration of 75 mL of Omnipaque 350 .  Oral contrast was not given.    COMPARISON:  CT abdomen and pelvis 01/11/2020    FINDINGS:  Imaged lung bases show minimal dependent atelectasis.  Base of the heart is within normal limits.    Liver is normal in size.  The previous hypodense lesion within the posterior aspect of the lateral left hepatic lobe, has increased in size now with irregular thickened walls demonstrating heterogeneous enhancement similar to adjacent hepatic parenchyma, measuring up  to 5 x 5.8 cm, previously up to 3.2 cm in maximum transaxial dimension.  There is thin peripheral area of hypoattenuation with more central hypoattenuating regions associated with this mass.  No new hepatic lesion definitively seen.    Surgical changes of cholecystectomy again noted.  There is pneumobilia with intrahepatic biliary prominence, similar in appearance to the previous examination.  There is continued prominence of the common bile duct also stable.    Portal vein, SMV, splenic vein and IVC appear patent.    Continued dilatation of the pancreatic duct similar to previous examination without convincing intraluminal filling defect.  Surgical changes are again noted involving the 2nd portion of the duodenum without obstruction.  Postsurgical changes of prior gastrojejunostomy again noted.  Spleen is upper limits of normal in size similar to prior without focal process seen.  Adrenal glands are within normal limits.    Bilateral kidneys are normal in size, shape and location with symmetric normal enhancement.  No hydronephrosis or significant perinephric stranding.  Urinary bladder is within normal limits.  Prostate and seminal vesicles are within normal limits.  Pelvic phleboliths noted.    No ascites, free air or lymphadenopathy.  No aortic aneurysm or dissection.  No significant atherosclerosis.    Appendix is within normal limits.  Surgical change of the small bowel similar to prior without obstruction.  Scattered liquid stool throughout the colon and rectum suggesting a nonspecific diarrheal illness.  No evidence of bowel obstruction or inflammation.  No pneumatosis or portal venous gas.    Osseous structures appear stable without acute or destructive process seen.   Impression:       1. Interval enlargement with irregular thick-walled enhancement of previous low-attenuation lesion in the lateral left hepatic lobe.  Given the short term interval changes, findings are highly concerning for abscess  formation in this patient with history of fever.  While malignancy is not entirely excluded, this is considered less likely.  2. Grossly stable pneumobilia, biliary ductal dilatation and pancreatic ductal dilatation.  3. Surgical changes of prior cholecystectomy and of the bowel, without associated obstruction, as above.  4. Liquid stool within the large bowel, nonspecific but could reflect sequela of a nonspecific diarrheal illness.  5. Additional stable findings as above.  This report was flagged in Epic as abnormal.      Electronically signed by: Josh Good MD  Date: 01/28/2020  Time: 14:48   X-Ray Chest AP Portable [[id number]] Resulted: 01/28/20 1301   Order Status: Completed Updated: 01/28/20 1303   Narrative:     EXAMINATION:  XR CHEST AP PORTABLE    CLINICAL HISTORY:  Weakness    TECHNIQUE:  Single frontal view of the chest was performed.    COMPARISON:  Chest radiograph 01/13/2020    FINDINGS:  No detrimental change.The lungs are clear, with normal appearance of pulmonary vasculature and no pleural effusion or pneumothorax.    The cardiac silhouette is normal in size. The hilar and mediastinal contours are unremarkable.    Bones are intact.   Impression:       No detrimental change or radiographic acute intrathoracic process seen.  Specifically, no focal consolidation.      Electronically signed by: Josh Good MD  Date: 01/28/2020  Time: 13:01   Imaging History     2020  Date Procedure Name PACS Link Status Accession Number Location   01/29/20 11:30 AM CT Guided Needle Placement  Images Final [id number] [facility]   01/29/20 11:27 AM IR Ascess Drainage With Tube Placement  Images Final [id number] [facility]   01/29/20 11:27 AM IR Ultrasound Guidance  Images Final [id number] [facility]   01/28/20 02:20 PM CT Abdomen Pelvis With Contrast  Images Final [id number] [facility]   01/28/20 12:45 PM X-Ray Chest AP Portable  Images Final [id number] [facility]   01/13/20 08:17 PM X-Ray Chest PA And Lateral  Images Final [id number] [facility]   01/11/20  04:58 PM CT Abdomen Pelvis With Contrast  Images Final [id number] [facility]   01/11/20 03:55 PM X-Ray Chest PA And Lateral  Images Final [id number] [facility]   01/28/20 12:00 AM CARDIAC MONITORING STRIPS  Final     01/28/20 12:00 AM CARDIAC MONITORING STRIPS  Final     01/29/20 02:59 PM Echo Color Flow Doppler? Yes; Bubble Contrast? No  Final [id number] [facility]

## 2020-01-31 NOTE — SUBJECTIVE & OBJECTIVE
"Past Medical History:   Diagnosis Date    Pancreatitis        Past Surgical History:   Procedure Laterality Date    ABDOMINAL SURGERY      CHOLECYSTECTOMY      LIVER SURGERY         Review of patient's allergies indicates:   Allergen Reactions    Compazine [prochlorperazine edisylate]      "It locks up my muscles."        Medications:  Medications Prior to Admission   Medication Sig    albuterol (PROVENTIL/VENTOLIN HFA) 90 mcg/actuation inhaler Inhale 2 puffs into the lungs every 6 (six) hours as needed for Wheezing or Shortness of Breath.    dicyclomine (BENTYL) 20 mg tablet Take 1 tablet (20 mg total) by mouth 2 (two) times daily.    fluticasone propionate (FLONASE) 50 mcg/actuation nasal spray 2 sprays (100 mcg total) by Each Nostril route once daily.    montelukast (SINGULAIR) 10 mg tablet Take 1 tablet (10 mg total) by mouth every evening.    ondansetron (ZOFRAN) 4 MG tablet Take 1 tablet (4 mg total) by mouth every 8 (eight) hours as needed for Nausea.    ondansetron (ZOFRAN-ODT) 4 MG TbDL Take 1 tablet (4 mg total) by mouth every 8 (eight) hours as needed.    oxycodone (ROXICODONE) 5 MG immediate release tablet Take 1 tablet (5 mg total) by mouth every 4 (four) hours as needed for Pain (Do not drink alcohol or drive while taking this.).     Antibiotics (From admission, onward)    None        Antifungals (From admission, onward)    None        Antivirals (From admission, onward)    None             There is no immunization history on file for this patient.    Family History     None        Social History     Socioeconomic History    Marital status: Single     Spouse name: Not on file    Number of children: Not on file    Years of education: Not on file    Highest education level: Not on file   Occupational History    Not on file   Social Needs    Financial resource strain: Not on file    Food insecurity:     Worry: Not on file     Inability: Not on file    Transportation needs:     Medical: " Not on file     Non-medical: Not on file   Tobacco Use    Smoking status: Former Smoker    Smokeless tobacco: Never Used   Substance and Sexual Activity    Alcohol use: Yes    Drug use: Never    Sexual activity: Not on file   Lifestyle    Physical activity:     Days per week: Not on file     Minutes per session: Not on file    Stress: Not on file   Relationships    Social connections:     Talks on phone: Not on file     Gets together: Not on file     Attends Baptism service: Not on file     Active member of club or organization: Not on file     Attends meetings of clubs or organizations: Not on file     Relationship status: Not on file   Other Topics Concern    Not on file   Social History Narrative    Not on file     Review of Systems   Constitutional: Negative for appetite change, chills, diaphoresis, fatigue, fever and unexpected weight change.   HENT: Negative for congestion, ear pain, sore throat and tinnitus.    Eyes: Negative for pain, redness and visual disturbance.   Respiratory: Negative for cough, shortness of breath and wheezing.    Cardiovascular: Negative for chest pain, palpitations and leg swelling.   Gastrointestinal: Positive for abdominal pain. Negative for constipation, diarrhea, rectal pain and vomiting.   Endocrine: Negative for cold intolerance and heat intolerance.   Genitourinary: Negative for dysuria, flank pain, frequency, hematuria and urgency.   Musculoskeletal: Negative for arthralgias, back pain, myalgias and neck pain.   Skin: Negative for rash.   Allergic/Immunologic: Negative for immunocompromised state.   Neurological: Negative for dizziness, light-headedness, numbness and headaches.   Hematological: Negative for adenopathy. Does not bruise/bleed easily.   Psychiatric/Behavioral: Negative for confusion and sleep disturbance. The patient is not nervous/anxious.      Objective:     Vital Signs (Most Recent):  Temp: 98.1 °F (36.7 °C) (01/30/20 1955)  Pulse: 64 (01/30/20  1955)  Resp: 18 (01/30/20 1955)  BP: 127/73 (01/30/20 1955)  SpO2: 97 % (01/30/20 1955) Vital Signs (24h Range):  Temp:  [98.1 °F (36.7 °C)-98.8 °F (37.1 °C)] 98.1 °F (36.7 °C)  Pulse:  [64-77] 64  Resp:  [17-19] 18  SpO2:  [96 %-97 %] 97 %  BP: (122-127)/(64-81) 127/73     Weight: 63.6 kg (140 lb 3.4 oz)  Body mass index is 20.12 kg/m².    Estimated Creatinine Clearance: 64.6 mL/min (based on SCr of 1.3 mg/dL).    Physical Exam   Constitutional: He is oriented to person, place, and time. He appears well-developed and well-nourished. No distress.       HENT:   Head: Normocephalic and atraumatic.   Cardiovascular: Normal rate, regular rhythm and normal heart sounds. Exam reveals no gallop and no friction rub.   No murmur heard.  Pulmonary/Chest: Effort normal and breath sounds normal. No respiratory distress. He has no wheezes. He has no rales.   Abdominal: Soft. Bowel sounds are normal. He exhibits no distension and no mass. There is tenderness. There is no rebound and no guarding.   Musculoskeletal: He exhibits no edema.   Neurological: He is alert and oriented to person, place, and time.   Skin: Skin is warm and dry. He is not diaphoretic.   Psychiatric: He has a normal mood and affect. His behavior is normal.       Significant Labs:   Blood Culture:   Recent Labs   Lab 01/13/20 2025 01/13/20  2030 01/28/20  1225 01/28/20  1310   LABBLOO Gram stain raffaele bottle: Gram negative rods   Positive results previously called  Gram stain aer bottle: Gram negative rods   Positive results previously called  ESCHERICHIA COLI*  KLEBSIELLA PNEUMONIAE* Gram stain raffaele bottle: Gram negative rods   Results called to and read back by: Ruben Patricia MROH  Gram stain aer bottle: Gram negative rods   Positive results previously called  ESCHERICHIA COLI  For susceptibility refer to order # [id number]  *  KLEBSIELLA PNEUMONIAE  For susceptibility refer to order # [id number]  * No Growth to date  No Growth to date  No Growth to  date No Growth to date  No Growth to date  No Growth to date     CBC:   Recent Labs   Lab 01/29/20  0429 01/30/20  0432   WBC 15.55* 8.87   HGB 10.5* 10.1*   HCT 33.1* 31.0*   * 394*     CMP:   Recent Labs   Lab 01/29/20  0429 01/30/20  0432    136   K 4.3 4.0    102   CO2 24 25    103   BUN 15 13   CREATININE 1.4 1.3   CALCIUM 9.0 9.0   PROT 7.3 7.1   ALBUMIN 2.1* 2.0*   BILITOT 0.7 0.7   ALKPHOS 212* 358*   AST 20 93*   ALT 35 64*   ANIONGAP 9 9   EGFRNONAA >60 >60     Urine Culture:   Recent Labs   Lab 01/13/20  2150   LABURIN ESCHERICHIA COLI  >100,000 cfu/ml  *     Urine Studies:   Recent Labs   Lab 01/28/20  1405   COLORU Dark yellow   SPECGRAV 1.020   NITRITE Positive*   UROBILINOGEN 2.0*   LEUKOCYTESUR 1+*     Wound Culture:   Recent Labs   Lab 01/29/20  1135   LABAERO GRAM NEGATIVE ZAHRA  Many  Identification and susceptibility pending  *     All pertinent labs within the past 24 hours have been reviewed.    Significant Imaging: I have reviewed all pertinent imaging results/findings within the past 24 hours.   IR Ascess Drainage With Tube Placement [[id number]] Resulted: 01/29/20 1655   Order Status: Completed Updated: 01/29/20 1658   Narrative:     EXAMINATION:  Drainage catheter placement    Procedural Personnel    Attending physician(s): Samuel    Fellow physician(s): None    Resident physician(s): None    Advanced practice provider(s): None    Pre-procedure diagnosis: Hepatic abscess    Post-procedure diagnosis: Same    Indication: Leukocytosis with fluid collection    Additional clinical history: None    Complications: No immediate complications.    TECHNIQUE:  -hepatic abscess drainage catheter placement under CT and ultrasound guidance    FINDINGS:  Pre-procedure    Consent: Informed consent for the procedure was obtained and time-out was performed prior to the procedure.    Preparation: The site was prepared and draped using maximal sterile barrier technique including  cutaneous antisepsis.    Antibiotic administered: Scheduled dose more than 1 hour from procedure start time  or more than 2 hours for vancomycin or fluoroquinolones    Anesthesia/sedation    Level of anesthesia/sedation: Moderate sedation (conscious sedation)    Anesthesia/sedation administered by: Independent trained observer under attending supervision with continuous monitoring of the patient's level of consciousness and physiologic status    Total intra-service sedation time (minutes): 24    Drainage catheter placement    The patient was positioned supine.  Initial imaging was performed. Local anesthesia was administered. The fluid collection was accessed using an access needle followed by wire insertion and serial dilation and a drainage catheter was placed. Position of the drainage catheter within the fluid collection was confirmed.    - Initial imaging findings: Hypodense left hepatic lobe collection    - Drainage catheter placed: Multipurpose drainage catheter    - External catheter securement: Non-absorbable suture    - Post-drainage imaging findings: No significant change    Contrast    Contrast agent: None    Contrast volume (mL): 0    Radiation Dose    CT dose length product ( mGy-cm): 398.23    Fluoroscopy time (  ):    Reference air kerma (  ):    Kerma area product (  ):    Additional Details    Additional description of procedure: None    Equipment details: None    Specimens removed: Aspirated fluid was sent for analysis.    Estimated blood loss (mL): Less than 10    Standardized report: SIR_DrainPlacement_v2    Attestation    Signer name: Samuel    I attest that I was present for the entire procedure. I reviewed the stored images and agree with the report as written.   Impression:       Ultrasound CT-guided percutaneous placement of a 8 [place] drainage catheter into left hepatic lobe abscess, yielding 3 mL of purulent fluid.    Plan:    Flushed the catheter was sterile saline twice daily.   Reimaging recommended prior to tube removal.    ______________________________________________________________________      Electronically signed by: Arpit Baker MD  Date: 01/29/2020  Time: 16:55   IR Ultrasound Guidance [[id number]] Resulted: 01/29/20 1655   Order Status: Completed Updated: 01/29/20 1658   Narrative:     EXAMINATION:  Drainage catheter placement    Procedural Personnel    Attending physician(s): Samuel    Fellow physician(s): None    Resident physician(s): None    Advanced practice provider(s): None    Pre-procedure diagnosis: Hepatic abscess    Post-procedure diagnosis: Same    Indication: Leukocytosis with fluid collection    Additional clinical history: None    Complications: No immediate complications.    TECHNIQUE:  -hepatic abscess drainage catheter placement under CT and ultrasound guidance    FINDINGS:  Pre-procedure    Consent: Informed consent for the procedure was obtained and time-out was performed prior to the procedure.    Preparation: The site was prepared and draped using maximal sterile barrier technique including cutaneous antisepsis.    Antibiotic administered: Scheduled dose more than 1 hour from procedure start time  or more than 2 hours for vancomycin or fluoroquinolones    Anesthesia/sedation    Level of anesthesia/sedation: Moderate sedation (conscious sedation)    Anesthesia/sedation administered by: Independent trained observer under attending supervision with continuous monitoring of the patient's level of consciousness and physiologic status    Total intra-service sedation time (minutes): 24    Drainage catheter placement    The patient was positioned supine.  Initial imaging was performed. Local anesthesia was administered. The fluid collection was accessed using an access needle followed by wire insertion and serial dilation and a drainage catheter was placed. Position of the drainage catheter within the fluid collection was confirmed.    - Initial imaging findings:  Hypodense left hepatic lobe collection    - Drainage catheter placed: Multipurpose drainage catheter    - External catheter securement: Non-absorbable suture    - Post-drainage imaging findings: No significant change    Contrast    Contrast agent: None    Contrast volume (mL): 0    Radiation Dose    CT dose length product ( mGy-cm): 398.23    Fluoroscopy time (  ):    Reference air kerma (  ):    Kerma area product (  ):    Additional Details    Additional description of procedure: None    Equipment details: None    Specimens removed: Aspirated fluid was sent for analysis.    Estimated blood loss (mL): Less than 10    Standardized report: SIR_DrainPlacement_v2    Attestation    Signer name: Samuel    I attest that I was present for the entire procedure. I reviewed the stored images and agree with the report as written.   Impression:       Ultrasound CT-guided percutaneous placement of a 8 [place] Island[place] drainage catheter into left hepatic lobe abscess, yielding 3 mL of purulent fluid.    Plan:    Flushed the catheter was sterile saline twice daily.  Reimaging recommended prior to tube removal.    ______________________________________________________________________      Electronically signed by: Arpit Baker MD  Date: 01/29/2020  Time: 16:55   CT Guided Needle Placement [[id number]] Resulted: 01/29/20 1655   Order Status: Completed Updated: 01/29/20 1658   Narrative:     EXAMINATION:  Drainage catheter placement    Procedural Personnel    Attending physician(s): Samuel    Fellow physician(s): None    Resident physician(s): None    Advanced practice provider(s): None    Pre-procedure diagnosis: Hepatic abscess    Post-procedure diagnosis: Same    Indication: Leukocytosis with fluid collection    Additional clinical history: None    Complications: No immediate complications.    TECHNIQUE:  -hepatic abscess drainage catheter placement under CT and ultrasound guidance    FINDINGS:  Pre-procedure    Consent: Informed consent  for the procedure was obtained and time-out was performed prior to the procedure.    Preparation: The site was prepared and draped using maximal sterile barrier technique including cutaneous antisepsis.    Antibiotic administered: Scheduled dose more than 1 hour from procedure start time  or more than 2 hours for vancomycin or fluoroquinolones    Anesthesia/sedation    Level of anesthesia/sedation: Moderate sedation (conscious sedation)    Anesthesia/sedation administered by: Independent trained observer under attending supervision with continuous monitoring of the patient's level of consciousness and physiologic status    Total intra-service sedation time (minutes): 24    Drainage catheter placement    The patient was positioned supine.  Initial imaging was performed. Local anesthesia was administered. The fluid collection was accessed using an access needle followed by wire insertion and serial dilation and a drainage catheter was placed. Position of the drainage catheter within the fluid collection was confirmed.    - Initial imaging findings: Hypodense left hepatic lobe collection    - Drainage catheter placed: Multipurpose drainage catheter    - External catheter securement: Non-absorbable suture    - Post-drainage imaging findings: No significant change    Contrast    Contrast agent: None    Contrast volume (mL): 0    Radiation Dose    CT dose length product ( mGy-cm): 398.23    Fluoroscopy time (  ):    Reference air kerma (  ):    Kerma area product (  ):    Additional Details    Additional description of procedure: None    Equipment details: None    Specimens removed: Aspirated fluid was sent for analysis.    Estimated blood loss (mL): Less than 10    Standardized report: SIR_DrainPlacement_v2    Attestation    Signer name: Samuel    I attest that I was present for the entire procedure. I reviewed the stored images and agree with the report as written.   Impression:       Ultrasound CT-guided percutaneous  placement of a 8 [place]ian drainage catheter into left hepatic lobe abscess, yielding 3 mL of purulent fluid.    Plan:    Flushed the catheter was sterile saline twice daily.  Reimaging recommended prior to tube removal.    ______________________________________________________________________      Electronically signed by: Arpit Baker MD  Date: 01/29/2020  Time: 16:55   CT Abdomen Pelvis With Contrast [[id number]] (Abnormal) Resulted: 01/28/20 1448   Order Status: Completed Updated: 01/28/20 1450   Narrative:     EXAMINATION:  CT ABDOMEN PELVIS WITH CONTRAST    CLINICAL HISTORY:  Abd pain, fever, abscess suspected;    TECHNIQUE:  Low dose axial images, sagittal and coronal reformations were obtained from the lung bases to the pubic symphysis following the IV administration of 75 mL of Omnipaque 350 .  Oral contrast was not given.    COMPARISON:  CT abdomen and pelvis 01/11/2020    FINDINGS:  Imaged lung bases show minimal dependent atelectasis.  Base of the heart is within normal limits.    Liver is normal in size.  The previous hypodense lesion within the posterior aspect of the lateral left hepatic lobe, has increased in size now with irregular thickened walls demonstrating heterogeneous enhancement similar to adjacent hepatic parenchyma, measuring up to 5 x 5.8 cm, previously up to 3.2 cm in maximum transaxial dimension.  There is thin peripheral area of hypoattenuation with more central hypoattenuating regions associated with this mass.  No new hepatic lesion definitively seen.    Surgical changes of cholecystectomy again noted.  There is pneumobilia with intrahepatic biliary prominence, similar in appearance to the previous examination.  There is continued prominence of the common bile duct also stable.    Portal vein, SMV, splenic vein and IVC appear patent.    Continued dilatation of the pancreatic duct similar to previous examination without convincing intraluminal filling defect.  Surgical changes are again  noted involving the 2nd portion of the duodenum without obstruction.  Postsurgical changes of prior gastrojejunostomy again noted.  Spleen is upper limits of normal in size similar to prior without focal process seen.  Adrenal glands are within normal limits.    Bilateral kidneys are normal in size, shape and location with symmetric normal enhancement.  No hydronephrosis or significant perinephric stranding.  Urinary bladder is within normal limits.  Prostate and seminal vesicles are within normal limits.  Pelvic phleboliths noted.    No ascites, free air or lymphadenopathy.  No aortic aneurysm or dissection.  No significant atherosclerosis.    Appendix is within normal limits.  Surgical change of the small bowel similar to prior without obstruction.  Scattered liquid stool throughout the colon and rectum suggesting a nonspecific diarrheal illness.  No evidence of bowel obstruction or inflammation.  No pneumatosis or portal venous gas.    Osseous structures appear stable without acute or destructive process seen.   Impression:       1. Interval enlargement with irregular thick-walled enhancement of previous low-attenuation lesion in the lateral left hepatic lobe.  Given the short term interval changes, findings are highly concerning for abscess formation in this patient with history of fever.  While malignancy is not entirely excluded, this is considered less likely.  2. Grossly stable pneumobilia, biliary ductal dilatation and pancreatic ductal dilatation.  3. Surgical changes of prior cholecystectomy and of the bowel, without associated obstruction, as above.  4. Liquid stool within the large bowel, nonspecific but could reflect sequela of a nonspecific diarrheal illness.  5. Additional stable findings as above.  This report was flagged in Epic as abnormal.      Electronically signed by: Josh Good MD  Date: 01/28/2020  Time: 14:48   X-Ray Chest AP Portable [[id number]] Resulted: 01/28/20 1301   Order Status:  Completed Updated: 01/28/20 1303   Narrative:     EXAMINATION:  XR CHEST AP PORTABLE    CLINICAL HISTORY:  Weakness    TECHNIQUE:  Single frontal view of the chest was performed.    COMPARISON:  Chest radiograph 01/13/2020    FINDINGS:  No detrimental change.The lungs are clear, with normal appearance of pulmonary vasculature and no pleural effusion or pneumothorax.    The cardiac silhouette is normal in size. The hilar and mediastinal contours are unremarkable.    Bones are intact.   Impression:       No detrimental change or radiographic acute intrathoracic process seen.  Specifically, no focal consolidation.      Electronically signed by: Josh Good MD  Date: 01/28/2020  Time: 13:01   Imaging History     2020  Date Procedure Name PACS Link Status Accession Number Location   01/29/20 11:30 AM CT Guided Needle Placement  Images Final [id number] [facility]   01/29/20 11:27 AM IR Ascess Drainage With Tube Placement  Images Final [id number] [facility]   01/29/20 11:27 AM IR Ultrasound Guidance  Images Final [id number] [facility]   01/28/20 02:20 PM CT Abdomen Pelvis With Contrast  Images Final [id number] [facility]   01/28/20 12:45 PM X-Ray Chest AP Portable  Images Final [id number] [facility]   01/13/20 08:17 PM X-Ray Chest PA And Lateral  Images Final [id number] [facility]   01/11/20 04:58 PM CT Abdomen Pelvis With Contrast  Images Final [id number] [facility]   01/11/20 03:55 PM X-Ray Chest PA And Lateral  Images Final [id number] [facility]   01/28/20 12:00 AM CARDIAC MONITORING STRIPS  Final     01/28/20 12:00 AM CARDIAC MONITORING STRIPS  Final     01/29/20 02:59 PM Echo Color Flow Doppler? Yes; Bubble Contrast? No  Final [id number] [facility]

## 2020-01-31 NOTE — PLAN OF CARE
Recommendations      1. Honor preferences as able     Goals: Meet > 85% EEN daily  Nutrition Goal Status: new  Communication of RD Recs: (POC)

## 2020-01-31 NOTE — CONSULTS
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 STRIPS  Final     01/28/20 12:00 AM CARDIAC MONITORING STRIPS  Final     01/29/20 02:59 PM Echo Color Flow Doppler? Yes; Bubble Contrast? No  Final [id number] [facility]

## 2020-01-31 NOTE — ASSESSMENT & PLAN NOTE
- liver abscess suspect secondary to biliary obstruction from CT  - bacteremia with pan sensitive E Coli and K pneumo 1/13 - repeats 1/28 NGTD  - s/p IR drain with Cx showing GNR  - on and continue Zosyn  - rec GI consult for evaluation of CT findings (primary team made aware) - may need MRCP  - stable non septic  - check HIV and Hep C
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
## 2020-01-31 NOTE — PROGRESS NOTES
"[facility]  Adult Nutrition  Progress Note    SUMMARY       Recommendations     1. Honor preferences as able    Goals: Meet > 85% EEN daily  Nutrition Goal Status: new  Communication of RD Recs: (POC)    Reason for Assessment    Reason For Assessment: identified at risk by screening criteria  Diagnosis: (Liver abcess)  Relevant Medical History: Pancreatitis  Interdisciplinary Rounds: did not attend    General Information Comments: Pt denies n/v/chewing or swallowing issues. Reports decreased appetite PTA x 2 days with onset of abcess sx. Pt eating 50-75% of meals and reports appetite improved, denies desire for oral supplement.  Pt reports UBW of 170#, but bedscale indicates wt of 140#. Pt denies weight loss; prev adm records also state wt of 170# x 2 weeks ago (perhaps self reported). ??? Pt denied 30 lb wt loss.  NFPE: pt slender, but adequate LBM, mild fat losses in UAU.  Pt does not meet criteria for malnutrition at this time due to conflicting data on weight loss and physical exam.      Nutrition Discharge Planning: Reg diet to meet estimated needs.    Nutrition Risk Screen    Nutrition Risk Screen: no indicators present    Nutrition/Diet History    Spiritual, Cultural Beliefs, Mosque Practices, Values that Affect Care: no  Factors Affecting Nutritional Intake: None identified at this time    Anthropometrics    Temp: 98.2 °F (36.8 °C)  Height: 5' 10" (177.8 cm)  Height (inches): 70 in  Weight Method: Bed Scale  Weight: 63.6 kg (140 lb 3.4 oz)  Weight (lb): 140.21 lb  Ideal Body Weight (IBW), Male: 166 lb  % Ideal Body Weight, Male (lb): 84.46 %  BMI (Calculated): 20.1  BMI Grade: 18.5-24.9 - normal       Lab/Procedures/Meds    Pertinent Labs Reviewed: reviewed  Pertinent Labs Comments: alb 2.0; WBC elevated  Pertinent Medications Reviewed: reviewed  Pertinent Medications Comments: abx    Estimated/Assessed Needs    Weight Used For Calorie Calculations: 63.6 kg (140 lb 3.4 oz)  Energy " Calorie Requirements (kcal): 1900 kcal (x 1.25)  Energy Need Method: [place]  Protein Requirements: 63-76g (1-1.2g/kg)  Weight Used For Protein Calculations: 63.6 kg (140 lb 3.4 oz)     Estimated Fluid Requirement Method: RDA Method  RDA Method (mL): 1900    Nutrition Prescription Ordered    Current Diet Order: Regular    Evaluation of Received Nutrient/Fluid Intake    IV Fluid (mL): 125(ml/hr)  I/O: reviewed  Energy Calories Required: not meeting needs  Protein Required: not meeting needs  Fluid Required: (per MD)  Comments: LBM: 1/26  Tolerance: tolerating  % Intake of Estimated Energy Needs: 50 - 75 %  % Meal Intake: 50 - 75 %    Nutrition Risk    Level of Risk/Frequency of Follow-up: (1 x week)     Assessment and Plan    Nutrition Problem  Inadequate energy intake    Related to (etiology):   Decreased appetite    Signs and Symptoms (as evidenced by):   50-75% of meals    Interventions  Collaboration with providers    Nutrition Diagnosis Status:   New        Monitor and Evaluation    Food and Nutrient Intake: energy intake, food and beverage intake  Food and Nutrient Adminstration: diet order  Anthropometric Measurements: weight, weight change  Biochemical Data, Medical Tests and Procedures: electrolyte and renal panel  Nutrition-Focused Physical Findings: overall appearance     Malnutrition Assessment       Orbital Region (Subcutaneous Fat Loss): well nourished  Upper Arm Region (Subcutaneous Fat Loss): mild depletion  Thoracic and Lumbar Region: well nourished   Mandaeism Region (Muscle Loss): well nourished  Clavicle Bone Region (Muscle Loss): well nourished  Clavicle and Acromion Bone Region (Muscle Loss): well nourished  Dorsal Hand (Muscle Loss): well nourished  Patellar Region (Muscle Loss): well nourished  Anterior Thigh Region (Muscle Loss): well nourished  Posterior Calf Region (Muscle Loss): well nourished       Subcutaneous Fat Loss (Final Summary): well nourished  Muscle Loss Evaluation (Final  Summary): well nourished    Severe Weight Loss (Malnutrition): (pt denies weight loss; conflicting data with pt and records)    Nutrition Follow-Up    RD Follow-up?: Yes

## 2020-01-31 NOTE — ASSESSMENT & PLAN NOTE
- liver abscess suspect secondary to biliary obstruction from CT   - bacteremia with pan sensitive E Coli and K pneumo 1/13 - repeats 1/28 NGTD  - s/p IR drain with Cx showing pan sensitive E coli and Kleb pneumo  - dc Zosyn    - start high dose Cipro 750 mg po bid which he will remain on until repeat imaging demonstrates resolution of abscess  - patient denies any history of aneurysm and CT ABDs reviewed and no mention on reports   - counseled not to take within 2hr of dairy products, mineral supplements or Calcium products due to binding effect and call for any tendon problems  - rec GI consult for evaluation of CT findings (primary team made aware) - may need MRCP - GI suspects CT changes are chronic and rec FU as oupt for  outpt UES +/- C scope  - stable non septic  - check HIV and Hep C - negative    Patient will be discharged on Cipro 750 mg po bid until imaging demonstrates resolution of abscess.    Please have the following outpatient labs drawn WEEKLY and faxed to Infectious Diseases clinic at [phone]:  - CBC with diff  - CMP    We will arrange for a follow-up appointment in Infectious Diseases clinic in 10-14d and CT ABD and Pelvis with contrast in 3 weeks.    Prior to discharge, please ensure the patient's follow-up has been scheduled.  If there is still no follow-up scheduled in Infectious Diseases clinic, please send an EPIC message to the Infectious Diseases charge nurse Chanel Blas.

## 2020-01-31 NOTE — PLAN OF CARE
Problem: Adult Inpatient Plan of Care  Goal: Plan of Care Review  Outcome: Ongoing, Progressing  Goal: Optimal Comfort and Wellbeing  Outcome: Ongoing, Progressing  Intervention: Provide Person-Centered Care  Flowsheets (Taken 1/31/2020 0403)  Trust Relationship/Rapport: care explained     Problem: Infection  Goal: Infection Symptom Resolution  Outcome: Ongoing, Progressing  Intervention: Prevent or Manage Infection  Flowsheets (Taken 1/31/2020 0403)  Fever Reduction/Comfort Measures: lightweight clothing; lightweight bedding     Problem: Pain Acute  Goal: Optimal Pain Control  Outcome: Ongoing, Progressing  Intervention: Develop Pain Management Plan  Flowsheets (Taken 1/31/2020 0403)  Pain Management Interventions: care clustered  Intervention: Prevent or Manage Pain  Flowsheets (Taken 1/31/2020 0403)  Sleep/Rest Enhancement: awakenings minimized; consistent schedule promoted  Sensory Stimulation Regulation: care clustered; lighting decreased; quiet environment promoted  Intervention: Optimize Psychosocial Wellbeing  Flowsheets (Taken 1/31/2020 0403)  Supportive Measures: active listening utilized  Diversional Activities: television

## 2020-01-31 NOTE — SUBJECTIVE & OBJECTIVE
Interval History: pt frustrated that he keeps getting asked the same questions by multiple people    Review of Systems   Constitutional: Positive for activity change, appetite change, chills, fatigue and unexpected weight change. Negative for fever.   Eyes: Negative for photophobia and visual disturbance.   Respiratory: Negative for cough and shortness of breath.    Cardiovascular: Negative for chest pain, palpitations and leg swelling.   Gastrointestinal: Positive for abdominal pain and nausea. Negative for diarrhea and vomiting.   Genitourinary: Negative for frequency, hematuria and urgency.   Musculoskeletal: Positive for myalgias.   Skin: Negative for pallor, rash and wound.   Neurological: Negative for light-headedness and headaches.   Psychiatric/Behavioral: Negative for confusion and decreased concentration.     Objective:     Vital Signs (Most Recent):  Temp: 98.2 °F (36.8 °C) (01/31/20 0507)  Pulse: 60 (01/31/20 0507)  Resp: 18 (01/31/20 0507)  BP: (!) 163/85 (01/31/20 0507)  SpO2: 98 % (01/31/20 0507) Vital Signs (24h Range):  Temp:  [97.6 °F (36.4 °C)-98.8 °F (37.1 °C)] 98.2 °F (36.8 °C)  Pulse:  [60-68] 60  Resp:  [18] 18  SpO2:  [96 %-98 %] 98 %  BP: (122-163)/(64-85) 163/85     Weight: 63.6 kg (140 lb 3.4 oz)  Body mass index is 20.12 kg/m².    Intake/Output Summary (Last 24 hours) at 1/31/2020 0652  Last data filed at 1/31/2020 0500  Gross per 24 hour   Intake 720 ml   Output 1100 ml   Net -380 ml      Physical Exam   Constitutional: He is oriented to person, place, and time. He appears well-developed and well-nourished. No distress.   HENT:   Head: Normocephalic and atraumatic.   Right Ear: External ear normal.   Left Ear: External ear normal.   Nose: Nose normal.   Mouth/Throat: Oropharynx is clear and moist.   Eyes: Pupils are equal, round, and reactive to light. Conjunctivae and EOM are normal.   Neck: Normal range of motion. Neck supple.   Cardiovascular: Normal rate, regular rhythm and intact  distal pulses.   Pulmonary/Chest: Effort normal and breath sounds normal. No respiratory distress. He has no wheezes. He has no rales.   Abdominal: Soft. Bowel sounds are normal. He exhibits no distension. There is tenderness.   No palpable hepatomegaly or splenomegaly, drain in RUQ   Musculoskeletal: Normal range of motion. He exhibits no edema or tenderness.   Neurological: He is alert and oriented to person, place, and time.   Skin: Skin is warm and dry.   Psychiatric: He has a normal mood and affect. Thought content normal.   Nursing note and vitals reviewed.      Significant Labs: Blood Culture: No results for input(s): LABBLOO in the last 48 hours.    Significant Imaging: I have reviewed and interpreted all pertinent imaging results/findings within the past 24 hours.

## 2020-02-01 VITALS
TEMPERATURE: 98 F | RESPIRATION RATE: 18 BRPM | WEIGHT: 140.19 LBS | SYSTOLIC BLOOD PRESSURE: 154 MMHG | BODY MASS INDEX: 20.07 KG/M2 | HEIGHT: 70 IN | HEART RATE: 64 BPM | OXYGEN SATURATION: 97 % | DIASTOLIC BLOOD PRESSURE: 83 MMHG

## 2020-02-01 PROBLEM — R78.81 BACTEREMIA: Status: RESOLVED | Noted: 2020-01-28 | Resolved: 2020-02-01

## 2020-02-01 LAB
ALBUMIN SERPL BCP-MCNC: 2.1 G/DL (ref 3.5–5.2)
ALP SERPL-CCNC: 518 U/L (ref 55–135)
ALT SERPL W/O P-5'-P-CCNC: 112 U/L (ref 10–44)
ANION GAP SERPL CALC-SCNC: 7 MMOL/L (ref 8–16)
AST SERPL-CCNC: 89 U/L (ref 10–40)
BASOPHILS # BLD AUTO: 0.02 K/UL (ref 0–0.2)
BASOPHILS NFR BLD: 0.3 % (ref 0–1.9)
BILIRUB SERPL-MCNC: 0.3 MG/DL (ref 0.1–1)
BUN SERPL-MCNC: 11 MG/DL (ref 6–20)
CALCIUM SERPL-MCNC: 8.9 MG/DL (ref 8.7–10.5)
CHLORIDE SERPL-SCNC: 105 MMOL/L (ref 95–110)
CO2 SERPL-SCNC: 27 MMOL/L (ref 23–29)
CREAT SERPL-MCNC: 1.1 MG/DL (ref 0.5–1.4)
DIFFERENTIAL METHOD: ABNORMAL
EOSINOPHIL # BLD AUTO: 0.1 K/UL (ref 0–0.5)
EOSINOPHIL NFR BLD: 1.1 % (ref 0–8)
ERYTHROCYTE [DISTWIDTH] IN BLOOD BY AUTOMATED COUNT: 13.6 % (ref 11.5–14.5)
EST. GFR  (AFRICAN AMERICAN): >60 ML/MIN/1.73 M^2
EST. GFR  (NON AFRICAN AMERICAN): >60 ML/MIN/1.73 M^2
GLUCOSE SERPL-MCNC: 105 MG/DL (ref 70–110)
HCT VFR BLD AUTO: 32.4 % (ref 40–54)
HGB BLD-MCNC: 10.3 G/DL (ref 14–18)
IMM GRANULOCYTES # BLD AUTO: 0.04 K/UL (ref 0–0.04)
IMM GRANULOCYTES NFR BLD AUTO: 0.5 % (ref 0–0.5)
LYMPHOCYTES # BLD AUTO: 1.8 K/UL (ref 1–4.8)
LYMPHOCYTES NFR BLD: 22.1 % (ref 18–48)
MCH RBC QN AUTO: 28 PG (ref 27–31)
MCHC RBC AUTO-ENTMCNC: 31.8 G/DL (ref 32–36)
MCV RBC AUTO: 88 FL (ref 82–98)
MONOCYTES # BLD AUTO: 0.8 K/UL (ref 0.3–1)
MONOCYTES NFR BLD: 9.6 % (ref 4–15)
NEUTROPHILS # BLD AUTO: 5.3 K/UL (ref 1.8–7.7)
NEUTROPHILS NFR BLD: 66.4 % (ref 38–73)
NRBC BLD-RTO: 0 /100 WBC
PLATELET # BLD AUTO: 425 K/UL (ref 150–350)
PMV BLD AUTO: 10.1 FL (ref 9.2–12.9)
POTASSIUM SERPL-SCNC: 4.4 MMOL/L (ref 3.5–5.1)
PROT SERPL-MCNC: 7.2 G/DL (ref 6–8.4)
RBC # BLD AUTO: 3.68 M/UL (ref 4.6–6.2)
SODIUM SERPL-SCNC: 139 MMOL/L (ref 136–145)
WBC # BLD AUTO: 8 K/UL (ref 3.9–12.7)

## 2020-02-01 PROCEDURE — 85025 COMPLETE CBC W/AUTO DIFF WBC: CPT

## 2020-02-01 PROCEDURE — 36415 COLL VENOUS BLD VENIPUNCTURE: CPT

## 2020-02-01 PROCEDURE — 63600175 PHARM REV CODE 636 W HCPCS: Performed by: EMERGENCY MEDICINE

## 2020-02-01 PROCEDURE — 25000003 PHARM REV CODE 250: Performed by: PHYSICIAN ASSISTANT

## 2020-02-01 PROCEDURE — 80053 COMPREHEN METABOLIC PANEL: CPT

## 2020-02-01 RX ORDER — AMOXICILLIN 250 MG
1 CAPSULE ORAL 2 TIMES DAILY
Status: DISCONTINUED | OUTPATIENT
Start: 2020-02-01 | End: 2020-02-01 | Stop reason: HOSPADM

## 2020-02-01 RX ORDER — CIPROFLOXACIN 750 MG/1
750 TABLET, FILM COATED ORAL EVERY 12 HOURS
Qty: 28 TABLET | Refills: 0 | Status: SHIPPED | OUTPATIENT
Start: 2020-02-01 | End: 2020-02-15

## 2020-02-01 RX ORDER — HYDROCODONE BITARTRATE AND ACETAMINOPHEN 10; 325 MG/1; MG/1
1 TABLET ORAL EVERY 4 HOURS PRN
Qty: 30 TABLET | Refills: 0 | OUTPATIENT
Start: 2020-02-01 | End: 2020-11-22

## 2020-02-01 RX ADMIN — SODIUM CHLORIDE, SODIUM LACTATE, POTASSIUM CHLORIDE, AND CALCIUM CHLORIDE: .6; .31; .03; .02 INJECTION, SOLUTION INTRAVENOUS at 10:02

## 2020-02-01 RX ADMIN — CIPROFLOXACIN HYDROCHLORIDE 750 MG: 250 TABLET, FILM COATED ORAL at 08:02
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
## 2020-02-01 NOTE — ASSESSMENT & PLAN NOTE
Evident on CT, IR consulted for drain placement, continue IV antibiotics and supportive care with IV fluids, analgesia and antipyretics.    ID and surgery consulted  Drain in place- very little output  IV abx   Pain control   Clinically improving     Repeat CT pending

## 2020-02-01 NOTE — PLAN OF CARE
POC continues with no acute concerns .Safety maintained. Significant other at bedside all shift. Pain denies pain. Dressing clean, dry and intact. Drain in place   and functional. Will continue to monitor .  Problem: Adult Inpatient Plan of Care  Goal: Plan of Care Review  Outcome: Ongoing, Progressing  Goal: Patient-Specific Goal (Individualization)  Outcome: Ongoing, Progressing  Goal: Absence of Hospital-Acquired Illness or Injury  Outcome: Ongoing, Progressing  Goal: Optimal Comfort and Wellbeing  Outcome: Ongoing, Progressing  Goal: Readiness for Transition of Care  Outcome: Ongoing, Progressing  Goal: Rounds/Family Conference  Outcome: Ongoing, Progressing     Problem: Infection  Goal: Infection Symptom Resolution  Outcome: Ongoing, Progressing     Problem: Pain Acute  Goal: Optimal Pain Control  Outcome: Ongoing, Progressing  Intervention: Develop Pain Management Plan  Flowsheets (Taken 2/1/2020 3682)  Pain Management Interventions: diversional activity provided

## 2020-02-01 NOTE — NURSING
Provided patient with abdominal binder as ordered per Dr Wallace in person. Patient did not allow me to put the binder on, not respond to me. I gave the binder to his Significant other with instructions on how to use. Verbalized understanding

## 2020-02-01 NOTE — PLAN OF CARE
Patient can discharge from  standpoint.       02/01/20 1624   Final Note   Assessment Type Final Discharge Note   Anticipated Discharge Disposition Home   What phone number can be called within the next 1-3 days to see how you are doing after discharge?   ([phone])   Right Care Referral Info   Post Acute Recommendation No Care

## 2020-02-01 NOTE — NURSING
Gave patient and significant other discharge instructions and prescription,  cipro at pharmacy. Verbalized understanding. Instructed to call when ready for wheelchair.

## 2020-02-01 NOTE — PLAN OF CARE
Problem: Adult Inpatient Plan of Care  Goal: Plan of Care Review  Outcome: Ongoing, Progressing  Goal: Patient-Specific Goal (Individualization)  Outcome: Ongoing, Progressing  Goal: Absence of Hospital-Acquired Illness or Injury  Outcome: Ongoing, Progressing  Goal: Optimal Comfort and Wellbeing  Outcome: Ongoing, Progressing  Goal: Readiness for Transition of Care  Outcome: Ongoing, Progressing  Goal: Rounds/Family Conference  Outcome: Ongoing, Progressing     Problem: Infection  Goal: Infection Symptom Resolution  Outcome: Ongoing, Progressing     Problem: Pain Acute  Goal: Optimal Pain Control  Outcome: Ongoing, Progressing

## 2020-02-01 NOTE — SUBJECTIVE & OBJECTIVE
Interval History: pt doesn't want to start oral antibiotic if abscess not improving     Review of Systems   Constitutional: Positive for activity change, appetite change, chills, fatigue and unexpected weight change. Negative for fever.   Eyes: Negative for photophobia and visual disturbance.   Respiratory: Negative for cough and shortness of breath.    Cardiovascular: Negative for chest pain, palpitations and leg swelling.   Gastrointestinal: Positive for abdominal pain and nausea. Negative for diarrhea and vomiting.   Genitourinary: Negative for frequency, hematuria and urgency.   Musculoskeletal: Positive for myalgias.   Skin: Negative for pallor, rash and wound.   Neurological: Negative for light-headedness and headaches.   Psychiatric/Behavioral: Negative for confusion and decreased concentration.     Objective:     Vital Signs (Most Recent):  Temp: 98.5 °F (36.9 °C) (02/01/20 0746)  Pulse: 60 (02/01/20 0746)  Resp: 19 (02/01/20 0746)  BP: (!) 140/80 (02/01/20 0746)  SpO2: 99 % (02/01/20 0746) Vital Signs (24h Range):  Temp:  [97.9 °F (36.6 °C)-98.5 °F (36.9 °C)] 98.5 °F (36.9 °C)  Pulse:  [60-68] 60  Resp:  [17-21] 19  SpO2:  [96 %-99 %] 99 %  BP: (127-147)/(64-81) 140/80     Weight: 63.6 kg (140 lb 3.4 oz)  Body mass index is 20.12 kg/m².    Intake/Output Summary (Last 24 hours) at 2/1/2020 0945  Last data filed at 2/1/2020 0700  Gross per 24 hour   Intake 480 ml   Output 715 ml   Net -235 ml      Physical Exam   Constitutional: He is oriented to person, place, and time. He appears well-developed and well-nourished. No distress.   HENT:   Head: Normocephalic and atraumatic.   Right Ear: External ear normal.   Left Ear: External ear normal.   Nose: Nose normal.   Mouth/Throat: Oropharynx is clear and moist.   Eyes: Pupils are equal, round, and reactive to light. Conjunctivae and EOM are normal.   Neck: Normal range of motion. Neck supple.   Cardiovascular: Normal rate, regular rhythm and intact distal pulses.    Pulmonary/Chest: Effort normal and breath sounds normal. No respiratory distress. He has no wheezes. He has no rales.   Abdominal: Soft. Bowel sounds are normal. He exhibits no distension. There is tenderness.   No palpable hepatomegaly or splenomegaly, drain in RUQ   Musculoskeletal: Normal range of motion. He exhibits no edema or tenderness.   Neurological: He is alert and oriented to person, place, and time.   Skin: Skin is warm and dry.   Psychiatric: He has a normal mood and affect. Thought content normal.   Nursing note and vitals reviewed.      Significant Labs: All pertinent labs within the past 24 hours have been reviewed.    Significant Imaging: I have reviewed and interpreted all pertinent imaging results/findings within the past 24 hours.
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
## 2020-02-02 LAB
BACTERIA BLD CULT: NORMAL
BACTERIA BLD CULT: NORMAL
BACTERIA SPEC ANAEROBE CULT: NORMAL

## 2020-02-02 NOTE — NURSING
Patient discharged with drain in place as ordered,  via wheelchair with transporter and wife. No distress noted

## 2020-02-03 LAB — MITOCHONDRIA AB TITR SER IF: NORMAL {TITER}
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
## 2020-03-02 LAB — FUNGUS SPEC CULT: NORMAL

## 2020-03-04 DIAGNOSIS — K75.0 LIVER ABSCESS: Primary | ICD-10-CM

## 2020-08-09 ENCOUNTER — HOSPITAL ENCOUNTER (EMERGENCY)
Facility: HOSPITAL | Age: 45
Discharge: HOME | End: 2020-08-09
Attending: EMERGENCY MEDICINE
Payer: OTHER MISCELLANEOUS

## 2020-08-09 VITALS
DIASTOLIC BLOOD PRESSURE: 78 MMHG | RESPIRATION RATE: 16 BRPM | WEIGHT: 175 LBS | BODY MASS INDEX: 25.11 KG/M2 | HEART RATE: 65 BPM | SYSTOLIC BLOOD PRESSURE: 119 MMHG | TEMPERATURE: 98 F | OXYGEN SATURATION: 98 %

## 2020-08-09 DIAGNOSIS — S50.01XA CONTUSION OF RIGHT ELBOW, INITIAL ENCOUNTER: Primary | ICD-10-CM
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
## 2020-08-09 DIAGNOSIS — R52 PAIN: ICD-10-CM

## 2020-08-09 DIAGNOSIS — S60.221A CONTUSION OF RIGHT HAND, INITIAL ENCOUNTER: ICD-10-CM

## 2020-08-09 PROCEDURE — 99284 EMERGENCY DEPT VISIT MOD MDM: CPT | Mod: 25,ER

## 2020-08-09 RX ORDER — IBUPROFEN 800 MG/1
800 TABLET ORAL EVERY 6 HOURS PRN
Qty: 20 TABLET | Refills: 0 | OUTPATIENT
Start: 2020-08-09 | End: 2020-09-05

## 2020-08-09 RX ORDER — TRAMADOL HYDROCHLORIDE 50 MG/1
50 TABLET ORAL EVERY 6 HOURS PRN
Qty: 12 TABLET | Refills: 0 | OUTPATIENT
Start: 2020-08-09 | End: 2020-11-22

## 2020-08-09 RX ORDER — CYCLOBENZAPRINE HCL 10 MG
10 TABLET ORAL 3 TIMES DAILY PRN
Qty: 20 TABLET | Refills: 0 | Status: SHIPPED | OUTPATIENT
Start: 2020-08-09 | End: 2020-08-19
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
## 2020-08-09 NOTE — Clinical Note
Jyoti Little was seen and treated in our emergency department on 8/9/2020.  He may return to work on 08/12/2020.       If you have any questions or concerns, please don't hesitate to call.      Mayelin Ledesma RN

## 2020-09-05 ENCOUNTER — HOSPITAL ENCOUNTER (EMERGENCY)
Facility: HOSPITAL | Age: 45
Discharge: HOME | End: 2020-09-05
Attending: EMERGENCY MEDICINE
Payer: OTHER MISCELLANEOUS

## 2020-09-05 VITALS
WEIGHT: 175 LBS | DIASTOLIC BLOOD PRESSURE: 64 MMHG | SYSTOLIC BLOOD PRESSURE: 115 MMHG | TEMPERATURE: 98 F | RESPIRATION RATE: 18 BRPM | HEIGHT: 70 IN | HEART RATE: 55 BPM | BODY MASS INDEX: 25.05 KG/M2 | OXYGEN SATURATION: 99 %

## 2020-09-05 DIAGNOSIS — S60.221A CONTUSION OF RIGHT HAND, INITIAL ENCOUNTER: ICD-10-CM

## 2020-09-05 DIAGNOSIS — S90.32XA CONTUSION OF LEFT FOOT, INITIAL ENCOUNTER: ICD-10-CM

## 2020-09-05 DIAGNOSIS — S69.91XA INJURY OF RIGHT WRIST: ICD-10-CM

## 2020-09-05 DIAGNOSIS — S99.929A: Primary | ICD-10-CM

## 2020-09-05 DIAGNOSIS — S92.535A CLOSED NONDISPLACED FRACTURE OF DISTAL PHALANX OF LESSER TOE OF LEFT FOOT, INITIAL ENCOUNTER: ICD-10-CM

## 2020-09-05 PROCEDURE — 99284 EMERGENCY DEPT VISIT MOD MDM: CPT | Mod: 25,ER

## 2020-09-05 PROCEDURE — 63600175 PHARM REV CODE 636 W HCPCS: Mod: ER | Performed by: EMERGENCY MEDICINE

## 2020-09-05 PROCEDURE — 29125 APPL SHORT ARM SPLINT STATIC: CPT | Mod: RT,ER

## 2020-09-05 PROCEDURE — 90715 TDAP VACCINE 7 YRS/> IM: CPT | Mod: ER | Performed by: EMERGENCY MEDICINE

## 2020-09-05 PROCEDURE — 25000003 PHARM REV CODE 250: Mod: ER | Performed by: EMERGENCY MEDICINE

## 2020-09-05 PROCEDURE — 90471 IMMUNIZATION ADMIN: CPT | Mod: ER | Performed by: EMERGENCY MEDICINE

## 2020-09-05 RX ORDER — CYCLOBENZAPRINE HCL 10 MG
10 TABLET ORAL 3 TIMES DAILY PRN
Qty: 15 TABLET | Refills: 0 | Status: SHIPPED | OUTPATIENT
Start: 2020-09-05 | End: 2020-09-10

## 2020-09-05 RX ORDER — DICLOFENAC SODIUM 10 MG/G
2 GEL TOPICAL 4 TIMES DAILY PRN
Qty: 1 TUBE | Refills: 0 | Status: SHIPPED | OUTPATIENT
Start: 2020-09-05 | End: 2020-09-15

## 2020-09-05 RX ORDER — OXYCODONE AND ACETAMINOPHEN 5; 325 MG/1; MG/1
1 TABLET ORAL
Status: COMPLETED | OUTPATIENT
Start: 2020-09-05 | End: 2020-09-05

## 2020-09-05 RX ORDER — ACETAMINOPHEN 500 MG
1000 TABLET ORAL EVERY 6 HOURS PRN
Qty: 30 TABLET | Refills: 0 | OUTPATIENT
Start: 2020-09-05 | End: 2020-11-22

## 2020-09-05 RX ORDER — IBUPROFEN 600 MG/1
600 TABLET ORAL EVERY 6 HOURS PRN
Qty: 20 TABLET | Refills: 0 | OUTPATIENT
Start: 2020-09-05 | End: 2020-11-22

## 2020-09-05 RX ORDER — OXYCODONE AND ACETAMINOPHEN 5; 325 MG/1; MG/1
1 TABLET ORAL EVERY 8 HOURS PRN
Qty: 6 TABLET | Refills: 0 | OUTPATIENT
Start: 2020-09-05 | End: 2020-11-22

## 2020-09-05 RX ADMIN — OXYCODONE HYDROCHLORIDE AND ACETAMINOPHEN 1 TABLET: 5; 325 TABLET ORAL at 10:09

## 2020-09-05 RX ADMIN — CLOSTRIDIUM TETANI TOXOID ANTIGEN (FORMALDEHYDE INACTIVATED), CORYNEBACTERIUM DIPHTHERIAE TOXOID ANTIGEN (FORMALDEHYDE INACTIVATED), BORDETELLA PERTUSSIS TOXOID ANTIGEN (GLUTARALDEHYDE INACTIVATED), BORDETELLA PERTUSSIS FILAMENTOUS HEMAGGLUTININ ANTIGEN (FORMALDEHYDE INACTIVATED), BORDETELLA PERTUSSIS PERTACTIN ANTIGEN, AND BORDETELLA PERTUSSIS FIMBRIAE 2/3 ANTIGEN 0.5 ML: 5; 2; 2.5; 5; 3; 5 INJECTION, SUSPENSION INTRAMUSCULAR at 11:09
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
## 2020-09-05 NOTE — Clinical Note
Jyoti Little was seen and treated in our emergency department on 9/5/2020.  He may return to work after being cleared by follow-up physician 09/11/2020.  Please follow-up with orthopedic physician for further evaluation and clearance to return to work     If you have any questions or concerns, please don't hesitate to call.      Beatriz Rosen, DO

## 2020-09-05 NOTE — DISCHARGE INSTRUCTIONS
Please follow-up with orthopedic surgery in 2 days for further evaluation of your wrist pain.  Please wear your splint at all times.

## 2020-09-14 ENCOUNTER — HOSPITAL ENCOUNTER (OUTPATIENT)
Dept: RADIOLOGY | Facility: HOSPITAL | Age: 45
Discharge: HOME | End: 2020-09-14
Attending: ORTHOPAEDIC SURGERY
Payer: OTHER MISCELLANEOUS

## 2020-09-14 DIAGNOSIS — S99.922A INJURY OF LEFT FOOT, INITIAL ENCOUNTER: Primary | ICD-10-CM

## 2020-09-14 DIAGNOSIS — S99.922A INJURY OF LEFT FOOT, INITIAL ENCOUNTER: ICD-10-CM

## 2020-09-14 PROCEDURE — 73630 XR FOOT COMPLETE 3 VIEW LEFT: ICD-10-PCS | Mod: 26,LT,, | Performed by: RADIOLOGY

## 2020-09-14 PROCEDURE — 73630 X-RAY EXAM OF FOOT: CPT | Mod: TC,FY,LT

## 2020-09-14 PROCEDURE — 73630 X-RAY EXAM OF FOOT: CPT | Mod: 26,LT,, | Performed by: RADIOLOGY

## 2020-11-22 ENCOUNTER — HOSPITAL ENCOUNTER (EMERGENCY)
Facility: HOSPITAL | Age: 45
Discharge: HOME | End: 2020-11-22
Attending: EMERGENCY MEDICINE
Payer: MEDICAID

## 2020-11-22 VITALS
TEMPERATURE: 99 F | RESPIRATION RATE: 18 BRPM | SYSTOLIC BLOOD PRESSURE: 126 MMHG | OXYGEN SATURATION: 98 % | HEIGHT: 70 IN | WEIGHT: 167 LBS | HEART RATE: 60 BPM | DIASTOLIC BLOOD PRESSURE: 70 MMHG | BODY MASS INDEX: 23.91 KG/M2

## 2020-11-22 DIAGNOSIS — Z90.49 STATUS POST CHOLECYSTECTOMY: ICD-10-CM

## 2020-11-22 DIAGNOSIS — R11.10 VOMITING, INTRACTABILITY OF VOMITING NOT SPECIFIED, PRESENCE OF NAUSEA NOT SPECIFIED, UNSPECIFIED VOMITING TYPE: ICD-10-CM

## 2020-11-22 DIAGNOSIS — R74.8 ELEVATED LIVER ENZYMES: Primary | ICD-10-CM

## 2020-11-22 DIAGNOSIS — R10.13 ABDOMINAL PAIN, ACUTE, EPIGASTRIC: ICD-10-CM

## 2020-11-22 LAB
ALBUMIN SERPL-MCNC: 3.9 G/DL (ref 3.3–5.5)
ALBUMIN SERPL-MCNC: 4 G/DL (ref 3.3–5.5)
ALP SERPL-CCNC: 297 U/L (ref 42–141)
ALP SERPL-CCNC: 319 U/L (ref 42–141)
BILIRUB SERPL-MCNC: 1 MG/DL (ref 0.2–1.6)
BILIRUB SERPL-MCNC: 1 MG/DL (ref 0.2–1.6)
BILIRUBIN, POC UA: ABNORMAL
BLOOD, POC UA: NEGATIVE
BUN SERPL-MCNC: 16 MG/DL (ref 7–22)
CALCIUM SERPL-MCNC: 9.5 MG/DL (ref 8–10.3)
CHLORIDE SERPL-SCNC: 106 MMOL/L (ref 98–108)
CLARITY, POC UA: CLEAR
COLOR, POC UA: ABNORMAL
CREAT SERPL-MCNC: 0.9 MG/DL (ref 0.6–1.2)
GLUCOSE SERPL-MCNC: 94 MG/DL (ref 73–118)
GLUCOSE, POC UA: NEGATIVE
KETONES, POC UA: ABNORMAL
LEUKOCYTE EST, POC UA: NEGATIVE
NITRITE, POC UA: NEGATIVE
PH UR STRIP: 6.5 [PH]
POC ALT (SGPT): 344 U/L (ref 10–47)
POC ALT (SGPT): 349 U/L (ref 10–47)
POC AMYLASE: 231 U/L (ref 14–97)
POC AST (SGOT): 399 U/L (ref 11–38)
POC AST (SGOT): 405 U/L (ref 11–38)
POC GGT: 253 U/L (ref 5–65)
POC TCO2: 28 MMOL/L (ref 18–33)
POTASSIUM BLD-SCNC: 4.3 MMOL/L (ref 3.6–5.1)
PROTEIN, POC UA: NEGATIVE
PROTEIN, POC: 7.2 G/DL (ref 6.4–8.1)
PROTEIN, POC: 7.6 G/DL (ref 6.4–8.1)
SODIUM BLD-SCNC: 143 MMOL/L (ref 128–145)
SPECIFIC GRAVITY, POC UA: 1.02
UROBILINOGEN, POC UA: 1 E.U./DL

## 2020-11-22 PROCEDURE — 81003 URINALYSIS AUTO W/O SCOPE: CPT | Mod: ER

## 2020-11-22 PROCEDURE — 80053 COMPREHEN METABOLIC PANEL: CPT | Mod: ER

## 2020-11-22 PROCEDURE — 25000003 PHARM REV CODE 250: Mod: ER | Performed by: EMERGENCY MEDICINE

## 2020-11-22 PROCEDURE — 80074 ACUTE HEPATITIS PANEL: CPT

## 2020-11-22 PROCEDURE — 96374 THER/PROPH/DIAG INJ IV PUSH: CPT | Mod: 59,ER

## 2020-11-22 PROCEDURE — 96361 HYDRATE IV INFUSION ADD-ON: CPT | Mod: ER

## 2020-11-22 PROCEDURE — 85025 COMPLETE CBC W/AUTO DIFF WBC: CPT | Mod: ER

## 2020-11-22 PROCEDURE — 99285 EMERGENCY DEPT VISIT HI MDM: CPT | Mod: 25,ER

## 2020-11-22 PROCEDURE — 25500020 PHARM REV CODE 255: Mod: ER | Performed by: EMERGENCY MEDICINE

## 2020-11-22 PROCEDURE — 82150 ASSAY OF AMYLASE: CPT | Mod: ER

## 2020-11-22 PROCEDURE — 63600175 PHARM REV CODE 636 W HCPCS: Mod: ER | Performed by: EMERGENCY MEDICINE

## 2020-11-22 PROCEDURE — 96372 THER/PROPH/DIAG INJ SC/IM: CPT | Mod: 59,ER

## 2020-11-22 RX ORDER — DICYCLOMINE HYDROCHLORIDE 20 MG/1
20 TABLET ORAL EVERY 6 HOURS PRN
Qty: 20 TABLET | Refills: 0 | OUTPATIENT
Start: 2020-11-22 | End: 2021-05-25

## 2020-11-22 RX ORDER — ONDANSETRON 4 MG/1
4 TABLET, ORALLY DISINTEGRATING ORAL
Status: COMPLETED | OUTPATIENT
Start: 2020-11-22 | End: 2020-11-22

## 2020-11-22 RX ORDER — KETOROLAC TROMETHAMINE 30 MG/ML
30 INJECTION, SOLUTION INTRAMUSCULAR; INTRAVENOUS
Status: COMPLETED | OUTPATIENT
Start: 2020-11-22 | End: 2020-11-22

## 2020-11-22 RX ORDER — DICYCLOMINE HYDROCHLORIDE 10 MG/ML
20 INJECTION INTRAMUSCULAR
Status: COMPLETED | OUTPATIENT
Start: 2020-11-22 | End: 2020-11-22

## 2020-11-22 RX ORDER — ONDANSETRON 4 MG/1
4 TABLET, FILM COATED ORAL EVERY 8 HOURS PRN
Qty: 12 TABLET | Refills: 0 | OUTPATIENT
Start: 2020-11-22 | End: 2021-05-25

## 2020-11-22 RX ADMIN — SODIUM CHLORIDE 1000 ML: 0.9 INJECTION, SOLUTION INTRAVENOUS at 04:11

## 2020-11-22 RX ADMIN — KETOROLAC TROMETHAMINE 30 MG: 30 INJECTION, SOLUTION INTRAMUSCULAR at 02:11

## 2020-11-22 RX ADMIN — IOHEXOL 75 ML: 350 INJECTION, SOLUTION INTRAVENOUS at 03:11

## 2020-11-22 RX ADMIN — ONDANSETRON 4 MG: 4 TABLET, ORALLY DISINTEGRATING ORAL at 12:11

## 2020-11-22 RX ADMIN — SODIUM CHLORIDE 2000 ML: 0.9 INJECTION, SOLUTION INTRAVENOUS at 01:11

## 2020-11-22 RX ADMIN — DICYCLOMINE HYDROCHLORIDE 20 MG: 20 INJECTION INTRAMUSCULAR at 01:11
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
## 2020-11-22 NOTE — Clinical Note
"Jyoti Sanchez"Sidney was seen and treated in our emergency department on 11/22/2020.  He may return to work on 11/24/2020.       If you have any questions or concerns, please don't hesitate to call.       RN    "

## 2020-11-22 NOTE — ED TRIAGE NOTES
Pt to er c/o ab pain with N/V since last pm--hx of pancreatitis--denies fever, bleeding, dizziness, and blurred vision

## 2020-11-23 LAB
HAV IGM SERPL QL IA: NEGATIVE
HBV CORE IGM SERPL QL IA: NEGATIVE
HBV SURFACE AG SERPL QL IA: NEGATIVE
HCV AB SERPL QL IA: NEGATIVE

## 2021-05-25 ENCOUNTER — HOSPITAL ENCOUNTER (EMERGENCY)
Facility: HOSPITAL | Age: 46
Discharge: HOME | End: 2021-05-25
Attending: EMERGENCY MEDICINE
Payer: MEDICAID

## 2021-05-25 VITALS
TEMPERATURE: 99 F | SYSTOLIC BLOOD PRESSURE: 128 MMHG | HEIGHT: 70 IN | OXYGEN SATURATION: 100 % | BODY MASS INDEX: 22.9 KG/M2 | HEART RATE: 86 BPM | DIASTOLIC BLOOD PRESSURE: 82 MMHG | RESPIRATION RATE: 20 BRPM | WEIGHT: 160 LBS

## 2021-05-25 DIAGNOSIS — K52.9 AGE (ACUTE GASTROENTERITIS): Primary | ICD-10-CM

## 2021-05-25 LAB
ALBUMIN SERPL-MCNC: 3.4 G/DL (ref 3.3–5.5)
ALBUMIN SERPL-MCNC: 3.6 G/DL (ref 3.3–5.5)
ALP SERPL-CCNC: 310 U/L (ref 42–141)
ALP SERPL-CCNC: 318 U/L (ref 42–141)
BILIRUB SERPL-MCNC: 1.3 MG/DL (ref 0.2–1.6)
BILIRUB SERPL-MCNC: 1.4 MG/DL (ref 0.2–1.6)
BILIRUBIN, POC UA: NEGATIVE
BLOOD, POC UA: NEGATIVE
BUN SERPL-MCNC: 15 MG/DL (ref 7–22)
CALCIUM SERPL-MCNC: 9.1 MG/DL (ref 8–10.3)
CHLORIDE SERPL-SCNC: 109 MMOL/L (ref 98–108)
CLARITY, POC UA: CLEAR
COLOR, POC UA: ABNORMAL
CREAT SERPL-MCNC: 0.9 MG/DL (ref 0.6–1.2)
GLUCOSE SERPL-MCNC: 117 MG/DL (ref 73–118)
GLUCOSE, POC UA: NEGATIVE
KETONES, POC UA: NEGATIVE
LEUKOCYTE EST, POC UA: NEGATIVE
NITRITE, POC UA: NEGATIVE
PH UR STRIP: 7.5 [PH]
POC ALT (SGPT): 573 U/L (ref 10–47)
POC ALT (SGPT): 582 U/L (ref 10–47)
POC AMYLASE: 198 U/L (ref 14–97)
POC AST (SGOT): 659 U/L (ref 11–38)
POC AST (SGOT): 685 U/L (ref 11–38)
POC GGT: 297 U/L (ref 5–65)
POC TCO2: 23 MMOL/L (ref 18–33)
POTASSIUM BLD-SCNC: 4 MMOL/L (ref 3.6–5.1)
PROTEIN, POC UA: ABNORMAL
PROTEIN, POC: 6.5 G/DL (ref 6.4–8.1)
PROTEIN, POC: 6.6 G/DL (ref 6.4–8.1)
SODIUM BLD-SCNC: 137 MMOL/L (ref 128–145)
SPECIFIC GRAVITY, POC UA: 1.02
UROBILINOGEN, POC UA: 4 E.U./DL

## 2021-05-25 PROCEDURE — 96365 THER/PROPH/DIAG IV INF INIT: CPT | Mod: 59,ER

## 2021-05-25 PROCEDURE — 25500020 PHARM REV CODE 255: Mod: ER | Performed by: EMERGENCY MEDICINE

## 2021-05-25 PROCEDURE — 96361 HYDRATE IV INFUSION ADD-ON: CPT | Mod: ER

## 2021-05-25 PROCEDURE — 85025 COMPLETE CBC W/AUTO DIFF WBC: CPT | Mod: ER

## 2021-05-25 PROCEDURE — 99285 EMERGENCY DEPT VISIT HI MDM: CPT | Mod: 25,ER

## 2021-05-25 PROCEDURE — 82040 ASSAY OF SERUM ALBUMIN: CPT | Mod: ER,91

## 2021-05-25 PROCEDURE — 25000003 PHARM REV CODE 250: Mod: ER | Performed by: EMERGENCY MEDICINE

## 2021-05-25 PROCEDURE — 96375 TX/PRO/DX INJ NEW DRUG ADDON: CPT | Mod: ER

## 2021-05-25 PROCEDURE — 81003 URINALYSIS AUTO W/O SCOPE: CPT | Mod: ER

## 2021-05-25 PROCEDURE — 80053 COMPREHEN METABOLIC PANEL: CPT | Mod: ER

## 2021-05-25 PROCEDURE — 63600175 PHARM REV CODE 636 W HCPCS: Mod: ER | Performed by: EMERGENCY MEDICINE

## 2021-05-25 RX ORDER — ONDANSETRON 8 MG/1
8 TABLET, ORALLY DISINTEGRATING ORAL EVERY 6 HOURS PRN
Qty: 12 TABLET | Refills: 0 | Status: SHIPPED | OUTPATIENT
Start: 2021-05-25

## 2021-05-25 RX ORDER — KETOROLAC TROMETHAMINE 30 MG/ML
15 INJECTION, SOLUTION INTRAMUSCULAR; INTRAVENOUS
Status: COMPLETED | OUTPATIENT
Start: 2021-05-25 | End: 2021-05-25

## 2021-05-25 RX ORDER — ONDANSETRON 2 MG/ML
8 INJECTION INTRAMUSCULAR; INTRAVENOUS
Status: COMPLETED | OUTPATIENT
Start: 2021-05-25 | End: 2021-05-25

## 2021-05-25 RX ORDER — DICYCLOMINE HYDROCHLORIDE 20 MG/1
20 TABLET ORAL
Qty: 20 TABLET | Refills: 0 | Status: SHIPPED | OUTPATIENT
Start: 2021-05-25 | End: 2021-06-24

## 2021-05-25 RX ORDER — PROMETHAZINE HYDROCHLORIDE 25 MG/1
25 SUPPOSITORY RECTAL EVERY 6 HOURS PRN
Qty: 10 SUPPOSITORY | Refills: 0 | Status: SHIPPED | OUTPATIENT
Start: 2021-05-25

## 2021-05-25 RX ORDER — MORPHINE SULFATE 4 MG/ML
8 INJECTION, SOLUTION INTRAMUSCULAR; INTRAVENOUS
Status: COMPLETED | OUTPATIENT
Start: 2021-05-25 | End: 2021-05-25

## 2021-05-25 RX ADMIN — ONDANSETRON 8 MG: 2 INJECTION INTRAMUSCULAR; INTRAVENOUS at 04:05

## 2021-05-25 RX ADMIN — IOHEXOL 75 ML: 350 INJECTION, SOLUTION INTRAVENOUS at 05:05

## 2021-05-25 RX ADMIN — SODIUM CHLORIDE 1000 ML: 0.9 INJECTION, SOLUTION INTRAVENOUS at 06:05

## 2021-05-25 RX ADMIN — MORPHINE SULFATE 8 MG: 4 INJECTION, SOLUTION INTRAMUSCULAR; INTRAVENOUS at 05:05

## 2021-05-25 RX ADMIN — SODIUM CHLORIDE 1000 ML: 0.9 INJECTION, SOLUTION INTRAVENOUS at 04:05

## 2021-05-25 RX ADMIN — KETOROLAC TROMETHAMINE 15 MG: 30 INJECTION, SOLUTION INTRAMUSCULAR; INTRAVENOUS at 04:05

## 2021-05-25 RX ADMIN — PROMETHAZINE HYDROCHLORIDE 25 MG: 25 INJECTION INTRAMUSCULAR; INTRAVENOUS at 05:05

## 2021-07-20 NOTE — DISCHARGE INSTRUCTIONS
"    Vomiting (Adult)  Vomiting is a common symptom that may be due to different causes. These include gastroenteritis ("stomach flu"), food poisoning and gastritis. There are other more serious causes of vomiting which may be hard to diagnose early in the illness. Therefore, it is important to watch for the warning signs listed below.  The main danger from repeated vomiting is dehydration. This is due to excess loss of water and minerals from the body. When this occurs, body fluids must be replaced.  Home care  · If symptoms are severe, rest at home for the next 24 hours.  · Because your symptoms may be from an infection, wash your hands frequently and well, and use alcohol-based  to avoid spreading the infection to others.  · Wash your hands for at least 20 seconds. Hum the happy birthday song twice for the correct length of time.  · Wash your hands after using the toilet, before and after preparing food, before eating food, after changing a diaper, cleaning a wound, caring for a sick person, and blowing your nose, coughing, or sneezing. You should also wash your hands after caring for someone who is sick, touching pet food, or treats, and touching an animal, or animal waste.  · You may use acetaminophen or NSAID medicines like ibuprofen or naproxen to control fever, unless another medicine was prescribed. If you have chronic liver or kidney disease or ever had a stomach ulcer or GI bleeding, talk with your doctor before using these medicines. Aspirin should never be used in anyone under 18 years of age who is ill with a fever. It may cause severe liver damage. Don't use NSAID medicines if you are already taking one for another condition (like arthritis) or are on aspirin (such as for heart disease, or after a stroke)  · Avoid tobacco and alcohol use, which may worsen your symptoms.  · If medicines for vomiting were prescribed, take as directed.  · Once vomiting stops, then follow these guidelines:  During " Detail Level: Simple the first 12 to 24 hours follow the diet below:  · Fruit juices. Apple, grape juice, clear fruit drinks, and electrolyte replacement drinks.  · Beverages. Soft drinks without caffeine; mineral water (plain or flavored), decaffeinated tea and coffee.  · Soups. Clear broth, consommé and bouillon  · Desserts. Plain gelatin, popsicles and fruit juice bars. As you feel better, you may add 6-8 ounces of yogurt per day.  During the next 24 hours you may add the following to the above:  · Hot cereal, plain toast, bread, rolls, crackers  · Plain noodles, rice, mashed potatoes, chicken noodle or rice soup  · Unsweetened canned fruit (avoid pineapple), bananas  · Limit caffeine and chocolate. No spices or seasonings except salt.  During the next 24 hours:  Gradually resume a normal diet, as you feel better and your symptoms lessen.  Follow-up care  Follow up with your healthcare provider, or as advised.  When to seek medical advice  Call your healthcare provider right away if any of these occur:  · Constant right-sided lower abdominal pain or increasing general abdominal pain  · Continued vomiting (unable to keep liquids down) for 24 hours  · Frequent diarrhea (more than 5 times a day); blood (red or black color) or mucus in diarrhea  · Reduced urine output or extreme thirst  · Weakness, dizziness or fainting  · Unusually drowsy or confused  · Fever of 100.4°F (38°C) oral or higher, or as directed  · Yellow color of the eyes or skin  Date Last Reviewed: 11/16/2015 © 2000-2017 [employer]. [address] [place] [address], [place] [address]. All rights reserved. This information is not intended as a substitute for professional medical care. Always follow your healthcare professional's instructions.      Zofran for nausea and vomiting.  Dicyclomine for pain.  Lots of clear liquids only while you have nausea vomiting or pain.  Please follow-up with your gastroenterologist, or the gastroenterologist above this week.  Rest.   Other Plan Od: lower lid TCA peel Send Procedure Quote As Charge: No

## 2021-12-08 NOTE — ASSESSMENT & PLAN NOTE
Evident on CT, IR consulted for drain placement, continue IV antibiotics and supportive care with IV fluids, analgesia and antipyretics.    ID and surgery consulted  Drain in place- very little output  IV abx   Pain control   Clinically improving    Patient Instructions and orders for Wound Care        Wound Cleaning and Dressings:     · Wash your hands with soap and water. Always wear gloves while changing dressings. Donot touch wound / ignacio-wound skin with un-gloved hands.  Remove old dressing, disca emergencies, please call your primary physician or go to the nearest emergency room.

## 2022-04-27 NOTE — PLAN OF CARE
Problem: Adult Inpatient Plan of Care  Goal: Plan of Care Review  Outcome: Ongoing, Progressing  Goal: Patient-Specific Goal (Individualization)  Outcome: Ongoing, Progressing  Goal: Absence of Hospital-Acquired Illness or Injury  Outcome: Ongoing, Progressing  Goal: Optimal Comfort and Wellbeing  Outcome: Ongoing, Progressing  Goal: Readiness for Transition of Care  Outcome: Ongoing, Progressing  Goal: Rounds/Family Conference  Outcome: Ongoing, Progressing     Problem: Infection  Goal: Infection Symptom Resolution  Outcome: Ongoing, Progressing     Problem: Pain Acute  Goal: Optimal Pain Control  Outcome: Ongoing, Progressing      spontaneous/unlabored
